# Patient Record
Sex: FEMALE | Race: OTHER | NOT HISPANIC OR LATINO | URBAN - METROPOLITAN AREA
[De-identification: names, ages, dates, MRNs, and addresses within clinical notes are randomized per-mention and may not be internally consistent; named-entity substitution may affect disease eponyms.]

---

## 2022-12-24 ENCOUNTER — INPATIENT (INPATIENT)
Facility: HOSPITAL | Age: 32
LOS: 2 days | Discharge: HOME | End: 2022-12-27
Attending: OBSTETRICS & GYNECOLOGY | Admitting: OBSTETRICS & GYNECOLOGY
Payer: COMMERCIAL

## 2022-12-24 VITALS — WEIGHT: 158.95 LBS | HEIGHT: 69 IN

## 2022-12-24 DIAGNOSIS — Z3A.00 WEEKS OF GESTATION OF PREGNANCY NOT SPECIFIED: ICD-10-CM

## 2022-12-24 DIAGNOSIS — Z34.80 ENCOUNTER FOR SUPERVISION OF OTHER NORMAL PREGNANCY, UNSPECIFIED TRIMESTER: ICD-10-CM

## 2022-12-24 DIAGNOSIS — O26.899 OTHER SPECIFIED PREGNANCY RELATED CONDITIONS, UNSPECIFIED TRIMESTER: ICD-10-CM

## 2022-12-24 LAB
ABO RH CONFIRMATION: SIGNIFICANT CHANGE UP
ALBUMIN SERPL ELPH-MCNC: 2.8 G/DL — LOW (ref 3.5–5)
ALP SERPL-CCNC: 175 U/L — HIGH (ref 40–120)
ALT FLD-CCNC: 22 U/L DA — SIGNIFICANT CHANGE UP (ref 10–60)
ANION GAP SERPL CALC-SCNC: 11 MMOL/L — SIGNIFICANT CHANGE UP (ref 5–17)
APTT BLD: 26.4 SEC — LOW (ref 27.5–35.5)
AST SERPL-CCNC: 30 U/L — SIGNIFICANT CHANGE UP (ref 10–40)
BASOPHILS # BLD AUTO: 0.02 K/UL — SIGNIFICANT CHANGE UP (ref 0–0.2)
BASOPHILS NFR BLD AUTO: 0.2 % — SIGNIFICANT CHANGE UP (ref 0–2)
BILIRUB SERPL-MCNC: 0.9 MG/DL — SIGNIFICANT CHANGE UP (ref 0.2–1.2)
BLD GP AB SCN SERPL QL: SIGNIFICANT CHANGE UP
BUN SERPL-MCNC: 10 MG/DL — SIGNIFICANT CHANGE UP (ref 7–18)
CALCIUM SERPL-MCNC: 9.6 MG/DL — SIGNIFICANT CHANGE UP (ref 8.4–10.5)
CHLORIDE SERPL-SCNC: 104 MMOL/L — SIGNIFICANT CHANGE UP (ref 96–108)
CO2 SERPL-SCNC: 22 MMOL/L — SIGNIFICANT CHANGE UP (ref 22–31)
CREAT SERPL-MCNC: 0.82 MG/DL — SIGNIFICANT CHANGE UP (ref 0.5–1.3)
EGFR: 99 ML/MIN/1.73M2 — SIGNIFICANT CHANGE UP
EOSINOPHIL # BLD AUTO: 0 K/UL — SIGNIFICANT CHANGE UP (ref 0–0.5)
EOSINOPHIL NFR BLD AUTO: 0 % — SIGNIFICANT CHANGE UP (ref 0–6)
GLUCOSE SERPL-MCNC: 84 MG/DL — SIGNIFICANT CHANGE UP (ref 70–99)
HCT VFR BLD CALC: 36.4 % — SIGNIFICANT CHANGE UP (ref 34.5–45)
HGB BLD-MCNC: 12.2 G/DL — SIGNIFICANT CHANGE UP (ref 11.5–15.5)
IMM GRANULOCYTES NFR BLD AUTO: 0.7 % — SIGNIFICANT CHANGE UP (ref 0–0.9)
INR BLD: 1.02 RATIO — SIGNIFICANT CHANGE UP (ref 0.88–1.16)
LIDOCAIN IGE QN: 129 U/L — SIGNIFICANT CHANGE UP (ref 73–393)
LYMPHOCYTES # BLD AUTO: 0.21 K/UL — LOW (ref 1–3.3)
LYMPHOCYTES # BLD AUTO: 1.6 % — LOW (ref 13–44)
MCHC RBC-ENTMCNC: 30 PG — SIGNIFICANT CHANGE UP (ref 27–34)
MCHC RBC-ENTMCNC: 33.5 GM/DL — SIGNIFICANT CHANGE UP (ref 32–36)
MCV RBC AUTO: 89.4 FL — SIGNIFICANT CHANGE UP (ref 80–100)
MONOCYTES # BLD AUTO: 0.91 K/UL — HIGH (ref 0–0.9)
MONOCYTES NFR BLD AUTO: 6.9 % — SIGNIFICANT CHANGE UP (ref 2–14)
NEUTROPHILS # BLD AUTO: 12.02 K/UL — HIGH (ref 1.8–7.4)
NEUTROPHILS NFR BLD AUTO: 90.6 % — HIGH (ref 43–77)
NRBC # BLD: 0 /100 WBCS — SIGNIFICANT CHANGE UP (ref 0–0)
PLATELET # BLD AUTO: 242 K/UL — SIGNIFICANT CHANGE UP (ref 150–400)
POTASSIUM SERPL-MCNC: 3.9 MMOL/L — SIGNIFICANT CHANGE UP (ref 3.5–5.3)
POTASSIUM SERPL-SCNC: 3.9 MMOL/L — SIGNIFICANT CHANGE UP (ref 3.5–5.3)
PROT SERPL-MCNC: 7.1 G/DL — SIGNIFICANT CHANGE UP (ref 6–8.3)
PROTHROM AB SERPL-ACNC: 12.2 SEC — SIGNIFICANT CHANGE UP (ref 10.5–13.4)
RBC # BLD: 4.07 M/UL — SIGNIFICANT CHANGE UP (ref 3.8–5.2)
RBC # FLD: 14.6 % — HIGH (ref 10.3–14.5)
SODIUM SERPL-SCNC: 137 MMOL/L — SIGNIFICANT CHANGE UP (ref 135–145)
WBC # BLD: 13.25 K/UL — HIGH (ref 3.8–10.5)
WBC # FLD AUTO: 13.25 K/UL — HIGH (ref 3.8–10.5)

## 2022-12-24 PROCEDURE — 59514 CESAREAN DELIVERY ONLY: CPT | Mod: U7

## 2022-12-24 PROCEDURE — 88307 TISSUE EXAM BY PATHOLOGIST: CPT | Mod: 26

## 2022-12-24 RX ORDER — SODIUM CHLORIDE 9 MG/ML
1000 INJECTION, SOLUTION INTRAVENOUS ONCE
Refills: 0 | Status: COMPLETED | OUTPATIENT
Start: 2022-12-24 | End: 2022-12-24

## 2022-12-24 RX ORDER — ACETAMINOPHEN 500 MG
975 TABLET ORAL
Refills: 0 | Status: DISCONTINUED | OUTPATIENT
Start: 2022-12-24 | End: 2022-12-27

## 2022-12-24 RX ORDER — IBUPROFEN 200 MG
600 TABLET ORAL EVERY 6 HOURS
Refills: 0 | Status: COMPLETED | OUTPATIENT
Start: 2022-12-24 | End: 2023-11-22

## 2022-12-24 RX ORDER — CEFAZOLIN SODIUM 1 G
2000 VIAL (EA) INJECTION ONCE
Refills: 0 | Status: COMPLETED | OUTPATIENT
Start: 2022-12-24 | End: 2022-12-24

## 2022-12-24 RX ORDER — KETOROLAC TROMETHAMINE 30 MG/ML
30 SYRINGE (ML) INJECTION EVERY 6 HOURS
Refills: 0 | Status: DISCONTINUED | OUTPATIENT
Start: 2022-12-24 | End: 2022-12-25

## 2022-12-24 RX ORDER — FAMOTIDINE 10 MG/ML
20 INJECTION INTRAVENOUS ONCE
Refills: 0 | Status: COMPLETED | OUTPATIENT
Start: 2022-12-24 | End: 2022-12-24

## 2022-12-24 RX ORDER — LANOLIN
1 OINTMENT (GRAM) TOPICAL EVERY 6 HOURS
Refills: 0 | Status: DISCONTINUED | OUTPATIENT
Start: 2022-12-24 | End: 2022-12-27

## 2022-12-24 RX ORDER — OXYTOCIN 10 UNIT/ML
333.33 VIAL (ML) INJECTION
Qty: 20 | Refills: 0 | Status: DISCONTINUED | OUTPATIENT
Start: 2022-12-24 | End: 2022-12-27

## 2022-12-24 RX ORDER — MORPHINE SULFATE 50 MG/1
0.3 CAPSULE, EXTENDED RELEASE ORAL ONCE
Refills: 0 | Status: DISCONTINUED | OUTPATIENT
Start: 2022-12-24 | End: 2022-12-27

## 2022-12-24 RX ORDER — SODIUM CHLORIDE 9 MG/ML
1000 INJECTION, SOLUTION INTRAVENOUS
Refills: 0 | Status: DISCONTINUED | OUTPATIENT
Start: 2022-12-24 | End: 2022-12-24

## 2022-12-24 RX ORDER — SIMETHICONE 80 MG/1
80 TABLET, CHEWABLE ORAL EVERY 4 HOURS
Refills: 0 | Status: DISCONTINUED | OUTPATIENT
Start: 2022-12-24 | End: 2022-12-27

## 2022-12-24 RX ORDER — SODIUM CHLORIDE 9 MG/ML
1000 INJECTION, SOLUTION INTRAVENOUS
Refills: 0 | Status: DISCONTINUED | OUTPATIENT
Start: 2022-12-24 | End: 2022-12-27

## 2022-12-24 RX ORDER — MAGNESIUM HYDROXIDE 400 MG/1
30 TABLET, CHEWABLE ORAL
Refills: 0 | Status: DISCONTINUED | OUTPATIENT
Start: 2022-12-24 | End: 2022-12-27

## 2022-12-24 RX ORDER — HEPARIN SODIUM 5000 [USP'U]/ML
5000 INJECTION INTRAVENOUS; SUBCUTANEOUS EVERY 12 HOURS
Refills: 0 | Status: DISCONTINUED | OUTPATIENT
Start: 2022-12-24 | End: 2022-12-27

## 2022-12-24 RX ORDER — TETANUS TOXOID, REDUCED DIPHTHERIA TOXOID AND ACELLULAR PERTUSSIS VACCINE, ADSORBED 5; 2.5; 8; 8; 2.5 [IU]/.5ML; [IU]/.5ML; UG/.5ML; UG/.5ML; UG/.5ML
0.5 SUSPENSION INTRAMUSCULAR ONCE
Refills: 0 | Status: DISCONTINUED | OUTPATIENT
Start: 2022-12-24 | End: 2022-12-27

## 2022-12-24 RX ORDER — DIPHENHYDRAMINE HCL 50 MG
25 CAPSULE ORAL EVERY 6 HOURS
Refills: 0 | Status: DISCONTINUED | OUTPATIENT
Start: 2022-12-24 | End: 2022-12-27

## 2022-12-24 RX ORDER — OXYCODONE HYDROCHLORIDE 5 MG/1
5 TABLET ORAL
Refills: 0 | Status: DISCONTINUED | OUTPATIENT
Start: 2022-12-24 | End: 2022-12-27

## 2022-12-24 RX ADMIN — Medication 100 MILLIGRAM(S): at 17:15

## 2022-12-24 RX ADMIN — FAMOTIDINE 20 MILLIGRAM(S): 10 INJECTION INTRAVENOUS at 17:15

## 2022-12-24 RX ADMIN — Medication 30 MILLIGRAM(S): at 23:19

## 2022-12-24 RX ADMIN — SODIUM CHLORIDE 2000 MILLILITER(S): 9 INJECTION, SOLUTION INTRAVENOUS at 17:14

## 2022-12-24 RX ADMIN — Medication 30 MILLIGRAM(S): at 23:50

## 2022-12-24 RX ADMIN — Medication 1000 MILLIUNIT(S)/MIN: at 18:55

## 2022-12-24 RX ADMIN — SODIUM CHLORIDE 125 MILLILITER(S): 9 INJECTION, SOLUTION INTRAVENOUS at 23:00

## 2022-12-24 RX ADMIN — SODIUM CHLORIDE 125 MILLILITER(S): 9 INJECTION, SOLUTION INTRAVENOUS at 17:15

## 2022-12-25 DIAGNOSIS — J06.9 ACUTE UPPER RESPIRATORY INFECTION, UNSPECIFIED: ICD-10-CM

## 2022-12-25 LAB
BASOPHILS # BLD AUTO: 0.01 K/UL — SIGNIFICANT CHANGE UP (ref 0–0.2)
BASOPHILS NFR BLD AUTO: 0.1 % — SIGNIFICANT CHANGE UP (ref 0–2)
EOSINOPHIL # BLD AUTO: 0 K/UL — SIGNIFICANT CHANGE UP (ref 0–0.5)
EOSINOPHIL NFR BLD AUTO: 0 % — SIGNIFICANT CHANGE UP (ref 0–6)
HCT VFR BLD CALC: 32.7 % — LOW (ref 34.5–45)
HGB BLD-MCNC: 10.6 G/DL — LOW (ref 11.5–15.5)
IMM GRANULOCYTES NFR BLD AUTO: 1.3 % — HIGH (ref 0–0.9)
LYMPHOCYTES # BLD AUTO: 0.37 K/UL — LOW (ref 1–3.3)
LYMPHOCYTES # BLD AUTO: 3.8 % — LOW (ref 13–44)
MCHC RBC-ENTMCNC: 29.4 PG — SIGNIFICANT CHANGE UP (ref 27–34)
MCHC RBC-ENTMCNC: 32.4 GM/DL — SIGNIFICANT CHANGE UP (ref 32–36)
MCV RBC AUTO: 90.8 FL — SIGNIFICANT CHANGE UP (ref 80–100)
MONOCYTES # BLD AUTO: 0.61 K/UL — SIGNIFICANT CHANGE UP (ref 0–0.9)
MONOCYTES NFR BLD AUTO: 6.2 % — SIGNIFICANT CHANGE UP (ref 2–14)
NEUTROPHILS # BLD AUTO: 8.68 K/UL — HIGH (ref 1.8–7.4)
NEUTROPHILS NFR BLD AUTO: 88.6 % — HIGH (ref 43–77)
NRBC # BLD: 0 /100 WBCS — SIGNIFICANT CHANGE UP (ref 0–0)
PLATELET # BLD AUTO: 203 K/UL — SIGNIFICANT CHANGE UP (ref 150–400)
RBC # BLD: 3.6 M/UL — LOW (ref 3.8–5.2)
RBC # FLD: 14.9 % — HIGH (ref 10.3–14.5)
T PALLIDUM AB TITR SER: NEGATIVE — SIGNIFICANT CHANGE UP
WBC # BLD: 9.8 K/UL — SIGNIFICANT CHANGE UP (ref 3.8–10.5)
WBC # FLD AUTO: 9.8 K/UL — SIGNIFICANT CHANGE UP (ref 3.8–10.5)

## 2022-12-25 RX ORDER — IBUPROFEN 200 MG
1 TABLET ORAL
Qty: 20 | Refills: 0
Start: 2022-12-25

## 2022-12-25 RX ORDER — DOCUSATE SODIUM 100 MG
1 CAPSULE ORAL
Qty: 30 | Refills: 0
Start: 2022-12-25

## 2022-12-25 RX ORDER — FERROUS SULFATE 325(65) MG
1 TABLET ORAL
Qty: 30 | Refills: 0
Start: 2022-12-25 | End: 2023-01-23

## 2022-12-25 RX ORDER — ACETAMINOPHEN 500 MG
2 TABLET ORAL
Qty: 20 | Refills: 0
Start: 2022-12-25

## 2022-12-25 RX ADMIN — HEPARIN SODIUM 5000 UNIT(S): 5000 INJECTION INTRAVENOUS; SUBCUTANEOUS at 06:50

## 2022-12-25 RX ADMIN — Medication 200 MILLIGRAM(S): at 06:50

## 2022-12-25 RX ADMIN — SIMETHICONE 80 MILLIGRAM(S): 80 TABLET, CHEWABLE ORAL at 13:12

## 2022-12-25 RX ADMIN — Medication 30 MILLIGRAM(S): at 06:50

## 2022-12-25 RX ADMIN — Medication 975 MILLIGRAM(S): at 21:13

## 2022-12-25 RX ADMIN — Medication 975 MILLIGRAM(S): at 09:59

## 2022-12-25 RX ADMIN — Medication 975 MILLIGRAM(S): at 15:52

## 2022-12-25 RX ADMIN — Medication 30 MILLIGRAM(S): at 13:11

## 2022-12-25 RX ADMIN — Medication 30 MILLIGRAM(S): at 14:34

## 2022-12-25 RX ADMIN — Medication 975 MILLIGRAM(S): at 21:45

## 2022-12-25 RX ADMIN — Medication 30 MILLIGRAM(S): at 18:37

## 2022-12-25 RX ADMIN — SIMETHICONE 80 MILLIGRAM(S): 80 TABLET, CHEWABLE ORAL at 18:37

## 2022-12-25 RX ADMIN — HEPARIN SODIUM 5000 UNIT(S): 5000 INJECTION INTRAVENOUS; SUBCUTANEOUS at 18:37

## 2022-12-25 RX ADMIN — Medication 30 MILLIGRAM(S): at 19:00

## 2022-12-25 RX ADMIN — Medication 975 MILLIGRAM(S): at 08:25

## 2022-12-25 RX ADMIN — Medication 30 MILLIGRAM(S): at 07:20

## 2022-12-25 RX ADMIN — Medication 975 MILLIGRAM(S): at 16:34

## 2022-12-25 NOTE — DISCHARGE NOTE OB - PLAN OF CARE
Continue breastfeeding.  Motrin as needed for pain.  Ambulate daily.  No heavy lifting or anything per vagina x 6 weeks - no sex, tampons, douching, tub baths, etc.  Follow up in office in 2 weeks for incision check, and then at 6 weeks for postpartum check. supportive care Continue breastfeeding.  Motrin as needed for pain.  Ambulate daily.  No heavy lifting or anything per vagina x 6 weeks - no sex, tampons, douching, tub baths, etc.  Follow up in office in 1-2 weeks for incision check, and then at 6 weeks for postpartum check. supportive care  tylenol for pain/fever, continue vitamin c

## 2022-12-25 NOTE — PROGRESS NOTE ADULT - PROBLEM SELECTOR PLAN 1
-Pain management as needed  -OOB and ambulate  -f/u Rpt CBC   -f/u void  -Advance diet to regular  -Encourage breastfeeding   -supportive care  -d/w Dr Long

## 2022-12-25 NOTE — DISCHARGE NOTE OB - NS MD DC FALL RISK RISK
For information on Fall & Injury Prevention, visit: [email or web address] OR  [email or web address] OR  https://www.cdc.gov/steadi/patient.html

## 2022-12-25 NOTE — DISCHARGE NOTE OB - HOSPITAL COURSE
pt resented w URI sympromps in labor had rpt c/s w normal post op course 31yo presenting in latent labor w h/o previous c/s w/ URI symptoms, fever 103 in ED, cough, POD #3 s/p repeat c/s @ 37.4wks, cough and flu neg, pt stable, uncomplicated delivery and postpartum course

## 2022-12-25 NOTE — DISCHARGE NOTE OB - MATERIALS PROVIDED
[facility] [place] Screening Program/  Immunization Record/Breastfeeding Mother’s Support Group Information/Guide to Postpartum Care/Back To Sleep Handout/Shaken Baby Prevention Handout/Breastfeeding Guide and Packet/Discharge Medication Information for Patients and Families Pocket Guide/Letter of Medical Neccessity

## 2022-12-25 NOTE — DISCHARGE NOTE OB - CARE PROVIDER_API CALL
Bina Mendez  OBSTETRICS AND GYNECOLOGY  [address] [place] [address]  Phone: [phone]  Fax: [phone]  Follow Up Time: 1 week

## 2022-12-25 NOTE — PROGRESS NOTE ADULT - PROBLEM SELECTOR PLAN 2
-Pain management as needed  -OOB and ambulate  -f/u Rpt CBC   -f/u void  -Advance diet to regular  -Encourage breastfeeding   -supportive care  -luma  -d/w Dr Long

## 2022-12-25 NOTE — DISCHARGE NOTE OB - CARE PLAN
Principal Discharge DX:	 delivery delivered  Assessment and plan of treatment:	Continue breastfeeding.  Motrin as needed for pain.  Ambulate daily.  No heavy lifting or anything per vagina x 6 weeks - no sex, tampons, douching, tub baths, etc.  Follow up in office in 2 weeks for incision check, and then at 6 weeks for postpartum check.  Secondary Diagnosis:	URI (upper respiratory infection)  Assessment and plan of treatment:	supportive care   1 Principal Discharge DX:	 delivery delivered  Assessment and plan of treatment:	Continue breastfeeding.  Motrin as needed for pain.  Ambulate daily.  No heavy lifting or anything per vagina x 6 weeks - no sex, tampons, douching, tub baths, etc.  Follow up in office in 1-2 weeks for incision check, and then at 6 weeks for postpartum check.  Secondary Diagnosis:	URI (upper respiratory infection)  Assessment and plan of treatment:	supportive care  tylenol for pain/fever, continue vitamin c

## 2022-12-25 NOTE — DISCHARGE NOTE OB - MEDICATION SUMMARY - MEDICATIONS TO TAKE
I will START or STAY ON the medications listed below when I get home from the hospital:    ibuprofen 600 mg oral tablet  -- 1 tab(s) by mouth every 6 hours   -- Do not take this drug if you are pregnant.  It is very important that you take or use this exactly as directed.  Do not skip doses or discontinue unless directed by your doctor.  May cause drowsiness or dizziness.  Obtain medical advice before taking any non-prescription drugs as some may affect the action of this medication.  Take with food or milk.    -- Indication: For pain    Tylenol Extra Strength 500 mg oral tablet  -- 2 tab(s) by mouth every 6 hours   -- This product contains acetaminophen.  Do not use  with any other product containing acetaminophen to prevent possible liver damage.    -- Indication: For pain    Tussin Mucus + Chest Congestion 100 mg/5 mL oral liquid  -- 10 milliliter(s) by mouth every 4 hours   -- Medication should be taken with plenty of water.    -- Indication: For Cough    ferrous sulfate 325 mg (65 mg elemental iron) oral tablet  -- 1 tab(s) by mouth once a day   -- Check with your doctor before becoming pregnant.  Do not chew, break, or crush.  May discolor urine or feces.    -- Indication: For anemia    Prenatal Plus Low Iron oral tablet  -- 1 tab(s) by mouth once a day   -- May discolor urine or feces.  Take with food or milk.    -- Indication: For Supplementation    Colace 100 mg oral capsule  -- 1 cap(s) by mouth once a day   -- Medication should be taken with plenty of water.    -- Indication: For Constipation

## 2022-12-25 NOTE — DISCHARGE NOTE OB - PATIENT PORTAL LINK FT
You can access the FollowMyHealth Patient Portal offered by [facility] by registering at the following website: [email or web address]. By joining [employer]’s FollowMyHealth portal, you will also be able to view your health information using other applications (apps) compatible with our system.

## 2022-12-26 LAB
BASOPHILS # BLD AUTO: 0.02 K/UL — SIGNIFICANT CHANGE UP (ref 0–0.2)
BASOPHILS NFR BLD AUTO: 0.2 % — SIGNIFICANT CHANGE UP (ref 0–2)
EOSINOPHIL # BLD AUTO: 0.03 K/UL — SIGNIFICANT CHANGE UP (ref 0–0.5)
EOSINOPHIL NFR BLD AUTO: 0.3 % — SIGNIFICANT CHANGE UP (ref 0–6)
HCT VFR BLD CALC: 35.2 % — SIGNIFICANT CHANGE UP (ref 34.5–45)
HGB BLD-MCNC: 11.4 G/DL — LOW (ref 11.5–15.5)
IMM GRANULOCYTES NFR BLD AUTO: 0.6 % — SIGNIFICANT CHANGE UP (ref 0–0.9)
LYMPHOCYTES # BLD AUTO: 1.09 K/UL — SIGNIFICANT CHANGE UP (ref 1–3.3)
LYMPHOCYTES # BLD AUTO: 11.5 % — LOW (ref 13–44)
MCHC RBC-ENTMCNC: 29.6 PG — SIGNIFICANT CHANGE UP (ref 27–34)
MCHC RBC-ENTMCNC: 32.4 GM/DL — SIGNIFICANT CHANGE UP (ref 32–36)
MCV RBC AUTO: 91.4 FL — SIGNIFICANT CHANGE UP (ref 80–100)
MONOCYTES # BLD AUTO: 0.37 K/UL — SIGNIFICANT CHANGE UP (ref 0–0.9)
MONOCYTES NFR BLD AUTO: 3.9 % — SIGNIFICANT CHANGE UP (ref 2–14)
NEUTROPHILS # BLD AUTO: 7.87 K/UL — HIGH (ref 1.8–7.4)
NEUTROPHILS NFR BLD AUTO: 83.5 % — HIGH (ref 43–77)
NRBC # BLD: 0 /100 WBCS — SIGNIFICANT CHANGE UP (ref 0–0)
PLATELET # BLD AUTO: 227 K/UL — SIGNIFICANT CHANGE UP (ref 150–400)
RBC # BLD: 3.85 M/UL — SIGNIFICANT CHANGE UP (ref 3.8–5.2)
RBC # FLD: 14.9 % — HIGH (ref 10.3–14.5)
WBC # BLD: 9.44 K/UL — SIGNIFICANT CHANGE UP (ref 3.8–10.5)
WBC # FLD AUTO: 9.44 K/UL — SIGNIFICANT CHANGE UP (ref 3.8–10.5)

## 2022-12-26 RX ORDER — IBUPROFEN 200 MG
600 TABLET ORAL EVERY 6 HOURS
Refills: 0 | Status: DISCONTINUED | OUTPATIENT
Start: 2022-12-26 | End: 2022-12-27

## 2022-12-26 RX ADMIN — Medication 600 MILLIGRAM(S): at 13:02

## 2022-12-26 RX ADMIN — Medication 975 MILLIGRAM(S): at 16:04

## 2022-12-26 RX ADMIN — Medication 975 MILLIGRAM(S): at 03:13

## 2022-12-26 RX ADMIN — Medication 600 MILLIGRAM(S): at 14:18

## 2022-12-26 RX ADMIN — Medication 975 MILLIGRAM(S): at 20:41

## 2022-12-26 RX ADMIN — Medication 200 MILLIGRAM(S): at 06:24

## 2022-12-26 RX ADMIN — Medication 975 MILLIGRAM(S): at 22:15

## 2022-12-26 RX ADMIN — MAGNESIUM HYDROXIDE 30 MILLILITER(S): 400 TABLET, CHEWABLE ORAL at 06:24

## 2022-12-26 RX ADMIN — Medication 975 MILLIGRAM(S): at 03:43

## 2022-12-26 RX ADMIN — Medication 600 MILLIGRAM(S): at 19:00

## 2022-12-26 RX ADMIN — Medication 975 MILLIGRAM(S): at 17:17

## 2022-12-26 RX ADMIN — Medication 600 MILLIGRAM(S): at 05:56

## 2022-12-26 RX ADMIN — Medication 975 MILLIGRAM(S): at 10:17

## 2022-12-26 RX ADMIN — HEPARIN SODIUM 5000 UNIT(S): 5000 INJECTION INTRAVENOUS; SUBCUTANEOUS at 05:55

## 2022-12-26 RX ADMIN — Medication 600 MILLIGRAM(S): at 18:31

## 2022-12-26 RX ADMIN — Medication 600 MILLIGRAM(S): at 06:26

## 2022-12-26 RX ADMIN — HEPARIN SODIUM 5000 UNIT(S): 5000 INJECTION INTRAVENOUS; SUBCUTANEOUS at 18:30

## 2022-12-26 RX ADMIN — Medication 975 MILLIGRAM(S): at 09:59

## 2022-12-26 RX ADMIN — Medication 600 MILLIGRAM(S): at 23:45

## 2022-12-26 NOTE — PROGRESS NOTE ADULT - PROBLEM SELECTOR PLAN 1
A/P: 33yo presenting in latent labor w h/o previous c/s w/ URI symptoms, fever 103 in ED, cough, POD #2 s/p repeat c/s @ 37.4wks, cough and flu neg, pt stable  - Robitussin prn for cough, encouraged to wear abdominal binder   -Pain management as needed  -cont post op care  -VTE prophylaxis: heparin, OOB and ambulate  - f/u Rpt CBC   -encourage incentive spirometer use  -Encourage breastfeeding   -d/w Dr. Fox

## 2022-12-27 VITALS
RESPIRATION RATE: 18 BRPM | HEART RATE: 100 BPM | TEMPERATURE: 98 F | DIASTOLIC BLOOD PRESSURE: 60 MMHG | OXYGEN SATURATION: 100 % | SYSTOLIC BLOOD PRESSURE: 101 MMHG

## 2022-12-27 RX ADMIN — Medication 200 MILLIGRAM(S): at 05:47

## 2022-12-27 RX ADMIN — Medication 600 MILLIGRAM(S): at 06:15

## 2022-12-27 RX ADMIN — HEPARIN SODIUM 5000 UNIT(S): 5000 INJECTION INTRAVENOUS; SUBCUTANEOUS at 05:47

## 2022-12-27 RX ADMIN — Medication 600 MILLIGRAM(S): at 12:55

## 2022-12-27 RX ADMIN — Medication 600 MILLIGRAM(S): at 12:20

## 2022-12-27 RX ADMIN — Medication 600 MILLIGRAM(S): at 00:15

## 2022-12-27 RX ADMIN — Medication 600 MILLIGRAM(S): at 05:46

## 2022-12-27 NOTE — PROGRESS NOTE ADULT - PROBLEM SELECTOR PROBLEM 2
URI (upper respiratory infection)

## 2022-12-27 NOTE — LACTATION INITIAL EVALUATION - LACTATION INTERVENTIONS
Engorgement relief measures/milk removal discussed.  Importance of breastfeeding on Cue and avoidance of supplements reinforced.  Taught hand expression and provided with manual pump per pt's preference for milk removal; Breastfeeding on cue 8-12X/24 hours with diaper count to assess for adequate intake, safe skin to skin and rooming-in encouraged. Reinforced benefits of  exclusive breastfeeding for first 6 months and provided with breastfeeding community resource list for breastfeeding support/assistance available post-discharge and of importance of early f/u with pediatrician within 2-3 days./initiate/review safe skin-to-skin/initiate/review hand expression/initiate/review pumping guidelines and safe milk handling/initiate/review techniques for position and latch/post discharge community resources provided/review techniques to manage sore nipples/engorgement/initiate/review breast massage/compression/reviewed components of an effective feeding and at least 8 effective feedings per day required/reviewed importance of monitoring infant diapers, the breastfeeding log, and minimum output each day/reviewed risks of unnecessary formula supplementation/reviewed risks of artificial nipples/reviewed benefits and recommendations for rooming in/reviewed feeding on demand/by cue at least 8 times a day/reviewed indications of inadequate milk transfer that would require supplementation Engorgement relief measures/milk removal discussed.  Importance of breastfeeding on Cue and avoidance of supplements reinforced.  Taught hand expression and provided with manual pump per pt's preference for milk removal; Breastfeeding on cue 8-12X/24 hours with diaper count to assess for adequate intake, safe skin to skin and rooming-in encouraged. Reinforced benefits of  exclusive breastfeeding for first 6 months and provided with breastfeeding community resource list for breastfeeding support/assistance available post-discharge and of importance of early f/u with pediatrician within 2-3 days. Referred to Tele-lactation program for breastfeeding f/u post-discharge and to [facility] for cont'd breastfeeding support in community/initiate/review safe skin-to-skin/initiate/review hand expression/initiate/review pumping guidelines and safe milk handling/initiate/review techniques for position and latch/post discharge community resources provided/review techniques to manage sore nipples/engorgement/initiate/review breast massage/compression/reviewed components of an effective feeding and at least 8 effective feedings per day required/reviewed importance of monitoring infant diapers, the breastfeeding log, and minimum output each day/reviewed risks of unnecessary formula supplementation/reviewed risks of artificial nipples/reviewed benefits and recommendations for rooming in/reviewed feeding on demand/by cue at least 8 times a day/reviewed indications of inadequate milk transfer that would require supplementation

## 2022-12-27 NOTE — PROGRESS NOTE ADULT - SUBJECTIVE AND OBJECTIVE BOX
Patient seen at bedside resting comfortably offers no new complaints. + Ambulation, + void without difficulty, + flatus;  +bm;  tolerating regular diet. Pt both breastfeeding and bottle feeding. C/o intermittent coughing improving Pt denies headache, blurry vision or epigastric pain, chest pain, shortness of breath, N/V/D,  dizziness, palpitations, worsening vaginal bleeding.     Vital Signs Last 24 Hrs  T(C): 36.8 (27 Dec 2022 06:33), Max: 36.8 (27 Dec 2022 06:33)  T(F): 98.3 (27 Dec 2022 06:33), Max: 98.3 (27 Dec 2022 06:33)  HR: 100 (27 Dec 2022 06:33) (83 - 100)  BP: 101/60 (27 Dec 2022 06:33) (101/60 - 117/70)  BP(mean): --  RR: 18 (27 Dec 2022 06:33) (18 - 18)  SpO2: 100% (27 Dec 2022 06:33) (100% - 100%)    Parameters below as of 27 Dec 2022 06:33  Patient On (Oxygen Delivery Method): room air        Gen: A&O x 3, NAD  Chest: CTA B/L  Cardiac: S1,S2  RRR  Breast: Soft, nontender, nonengorged  Abdomen: +BS; soft; Nontender, nondistended, Incision C/D/I steri strips in place   Gyn: Minimal lochia  Extremities: Nontender, DTRS 2+, no worsening edema                          11.4   9.44  )-----------( 227      ( 26 Dec 2022 09:05 )             35.2       A/P: 33yo presenting in latent labor w h/o previous c/s w/ URI symptoms, fever 103 in ED, cough, POD #3 s/p repeat c/s @ 37.4wks, cough and flu neg, pt stable  - Robitussin prn for cough, encouraged to wear abdominal binder   -Pain management as needed  -cont post op care  -VTE prophylaxis: heparin, OOB and ambulate  -encourage incentive spirometer use  -Encourage breastfeeding   -d/w Dr. Friedman
Patient seen at bedside resting comfortably offers no new complaints. Pt still reporting persistent coughing, with incisional pain while coughing.+ Ambulation, + void without difficulty, + flatus;  no bm; tolerating regular diet. Pt both breastfeeding and bottle feeding. Pt denies fever, weakness, headache, blurry vision or epigastric pain, chest pain, shortness of breath, N/V/D,  dizziness, palpitations, worsening vaginal bleeding.    Vital Signs Last 24 Hrs  T(C): 36.9 (26 Dec 2022 05:02), Max: 36.9 (26 Dec 2022 05:02)  T(F): 98.4 (26 Dec 2022 05:02), Max: 98.4 (26 Dec 2022 05:02)  HR: 84 (26 Dec 2022 05:02) (84 - 101)  BP: 119/81 (26 Dec 2022 05:02) (118/78 - 128/81)  BP(mean): --  RR: 18 (26 Dec 2022 05:02) (18 - 18)  SpO2: 98% (26 Dec 2022 05:02) (97% - 98%)    Parameters below as of 26 Dec 2022 05:02  Patient On (Oxygen Delivery Method): room air        Gen: A&O x 3, NAD  Chest: CTABL  Cardiac: S1, S2, RRR  Breast: Soft, nontender, nonengorged  Abdomen: +BS; soft; Nontender, nondistended, Incision C/D/I steri strips in place   Gyn: Minimal lochia  Extremities: Nontender, DTRS 2+, no worsening edema                          10.6   9.80  )-----------( 203      ( 25 Dec 2022 06:56 )             32.7       A/P: 33yo presenting in latent labor w h/o previous c/s w/ URI symptoms, fever 103 in ED, cough, POD #2 s/p repeat c/s @ 37.4wks, cough and flu neg, pt stable  - Robitussin prn for cough, encouraged to wear abdominal binder   -Pain management as needed  -cont post op care  -VTE prophylaxis: heparin, OOB and ambulate  - f/u Rpt CBC   -encourage incentive spirometer use  -Encourage breastfeeding   -d/w Dr. Fox
Patient seen at bedside resting comfortably offers no new complaints. not yet ambulating, renner just removed no void spontaneously yet.  + flatus;  no bm; tolerating clr liq diet. both breast and bottle feeding. + dry non productive cough. Denies HA, blurry vision or epigastric pain, CP, SOB, N/V/D, dizziness, palpitations, worsening vaginal bleeding.    Vital Signs Last 24 Hrs  T(C): 36.8 (25 Dec 2022 06:21), Max: 39.5 (24 Dec 2022 13:48)  T(F): 98.2 (25 Dec 2022 06:21), Max: 103.1 (24 Dec 2022 13:48)  HR: 74 (25 Dec 2022 06:21) (74 - 141)  BP: 125/74 (25 Dec 2022 06:21) (110/62 - 134/71)  BP(mean): 96 (24 Dec 2022 22:00) (64 - 96)  RR: 18 (25 Dec 2022 06:21) (16 - 20)  SpO2: 98% (25 Dec 2022 06:21) (95% - 99%)    Parameters below as of 25 Dec 2022 06:21  Patient On (Oxygen Delivery Method): room air        Gen: A&O x 3, NAD  Chest: CTA B/L  Cardiac: S1,S2  RRR  Breast: Soft, nontender, nonengorged  Abdomen: +BS; soft; Nontender, nondistended; dressing removed incision C/D/I steri strips in place  Gyn: minimal lochia   Extremities: Nontender, venodynes in place                          10.6   9.80  )-----------( 203      ( 25 Dec 2022 06:56 )             32.7

## 2022-12-27 NOTE — LACTATION INITIAL EVALUATION - INTERVENTION OUTCOME
verbalizes understanding/demonstrates understanding of teaching/good return demonstration/needs met Tele-lactation program/verbalizes understanding/demonstrates understanding of teaching/good return demonstration/needs met/Lactation team to follow up

## 2022-12-27 NOTE — PROGRESS NOTE ADULT - ASSESSMENT
A/P: 31yo presenting in latent labor w h/o previous c/s w/ URI symptoms, fever 103 in ED, cough, POD #2 s/p repeat c/s @ 37.4wks, cough and flu neg, pt stable  - Robitussin prn for cough, encouraged to wear abdominal binder   -Pain management as needed  -cont post op care  -VTE prophylaxis: heparin, OOB and ambulate  - f/u Rpt CBC   -encourage incentive spirometer use  -Encourage breastfeeding   -d/w Dr. Fox
A/P: 33yo presenting in latent labor w h/o previous c/s w/ URI symptoms, fever 103 in ED, cough, POD #3 s/p repeat c/s @ 37.4wks, cough and flu neg, pt stable  - Robitussin prn for cough, encouraged to wear abdominal binder   -Pain management as needed  -cont post op care  -VTE prophylaxis: heparin, OOB and ambulate  -encourage incentive spirometer use  -Encourage breastfeeding   -d/w Dr. Friedman 
A/P: POD #1 s/p rpt c/s w viral URI

## 2022-12-27 NOTE — PROGRESS NOTE ADULT - PROBLEM SELECTOR PLAN 1
A/P: 33yo presenting in latent labor w h/o previous c/s w/ URI symptoms, fever 103 in ED, cough, POD #3 s/p repeat c/s @ 37.4wks, cough and flu neg, pt stable  - Robitussin prn for cough, encouraged to wear abdominal binder   -Pain management as needed  -cont post op care  -VTE prophylaxis: heparin, OOB and ambulate  -encourage incentive spirometer use  -Encourage breastfeeding   -d/w Dr. Friedman

## 2022-12-30 ENCOUNTER — NON-APPOINTMENT (OUTPATIENT)
Age: 32
End: 2022-12-30

## 2022-12-30 PROBLEM — Z00.00 ENCOUNTER FOR PREVENTIVE HEALTH EXAMINATION: Status: ACTIVE | Noted: 2022-12-30

## 2023-01-06 LAB — SURGICAL PATHOLOGY STUDY: SIGNIFICANT CHANGE UP

## 2023-08-05 ENCOUNTER — EMERGENCY (EMERGENCY)
Facility: HOSPITAL | Age: 33
LOS: 1 days | Discharge: HOME | End: 2023-08-05
Attending: EMERGENCY MEDICINE
Payer: SELF-PAY

## 2023-08-05 VITALS
WEIGHT: 128.97 LBS | TEMPERATURE: 98 F | DIASTOLIC BLOOD PRESSURE: 72 MMHG | HEIGHT: 69 IN | HEART RATE: 75 BPM | SYSTOLIC BLOOD PRESSURE: 115 MMHG | RESPIRATION RATE: 18 BRPM | OXYGEN SATURATION: 97 %

## 2023-08-05 VITALS
RESPIRATION RATE: 17 BRPM | HEART RATE: 63 BPM | SYSTOLIC BLOOD PRESSURE: 111 MMHG | DIASTOLIC BLOOD PRESSURE: 58 MMHG | OXYGEN SATURATION: 100 % | TEMPERATURE: 98 F

## 2023-08-05 LAB
ALBUMIN SERPL ELPH-MCNC: 3.6 G/DL — SIGNIFICANT CHANGE UP (ref 3.5–5)
ALP SERPL-CCNC: 57 U/L — SIGNIFICANT CHANGE UP (ref 40–120)
ALT FLD-CCNC: 13 U/L DA — SIGNIFICANT CHANGE UP (ref 10–60)
ANION GAP SERPL CALC-SCNC: 9 MMOL/L — SIGNIFICANT CHANGE UP (ref 5–17)
APPEARANCE UR: ABNORMAL
AST SERPL-CCNC: 11 U/L — SIGNIFICANT CHANGE UP (ref 10–40)
BACTERIA # UR AUTO: ABNORMAL /HPF
BASOPHILS # BLD AUTO: 0.03 K/UL — SIGNIFICANT CHANGE UP (ref 0–0.2)
BASOPHILS NFR BLD AUTO: 0.3 % — SIGNIFICANT CHANGE UP (ref 0–2)
BILIRUB SERPL-MCNC: 0.6 MG/DL — SIGNIFICANT CHANGE UP (ref 0.2–1.2)
BILIRUB UR-MCNC: NEGATIVE — SIGNIFICANT CHANGE UP
BUN SERPL-MCNC: 12 MG/DL — SIGNIFICANT CHANGE UP (ref 7–18)
CALCIUM SERPL-MCNC: 8.7 MG/DL — SIGNIFICANT CHANGE UP (ref 8.4–10.5)
CHLORIDE SERPL-SCNC: 106 MMOL/L — SIGNIFICANT CHANGE UP (ref 96–108)
CO2 SERPL-SCNC: 25 MMOL/L — SIGNIFICANT CHANGE UP (ref 22–31)
COLOR SPEC: ABNORMAL
CREAT SERPL-MCNC: 0.79 MG/DL — SIGNIFICANT CHANGE UP (ref 0.5–1.3)
DIFF PNL FLD: ABNORMAL
EGFR: 103 ML/MIN/1.73M2 — SIGNIFICANT CHANGE UP
EOSINOPHIL # BLD AUTO: 0.02 K/UL — SIGNIFICANT CHANGE UP (ref 0–0.5)
EOSINOPHIL NFR BLD AUTO: 0.2 % — SIGNIFICANT CHANGE UP (ref 0–6)
EPI CELLS # UR: ABNORMAL /HPF
GLUCOSE SERPL-MCNC: 77 MG/DL — SIGNIFICANT CHANGE UP (ref 70–99)
GLUCOSE UR QL: NEGATIVE — SIGNIFICANT CHANGE UP
HCG SERPL-ACNC: 235 MIU/ML — HIGH
HCT VFR BLD CALC: 35.5 % — SIGNIFICANT CHANGE UP (ref 34.5–45)
HGB BLD-MCNC: 11.6 G/DL — SIGNIFICANT CHANGE UP (ref 11.5–15.5)
IMM GRANULOCYTES NFR BLD AUTO: 0.3 % — SIGNIFICANT CHANGE UP (ref 0–0.9)
KETONES UR-MCNC: ABNORMAL
LEUKOCYTE ESTERASE UR-ACNC: ABNORMAL
LYMPHOCYTES # BLD AUTO: 2.13 K/UL — SIGNIFICANT CHANGE UP (ref 1–3.3)
LYMPHOCYTES # BLD AUTO: 21.5 % — SIGNIFICANT CHANGE UP (ref 13–44)
MCHC RBC-ENTMCNC: 30.2 PG — SIGNIFICANT CHANGE UP (ref 27–34)
MCHC RBC-ENTMCNC: 32.7 GM/DL — SIGNIFICANT CHANGE UP (ref 32–36)
MCV RBC AUTO: 92.4 FL — SIGNIFICANT CHANGE UP (ref 80–100)
MONOCYTES # BLD AUTO: 0.56 K/UL — SIGNIFICANT CHANGE UP (ref 0–0.9)
MONOCYTES NFR BLD AUTO: 5.7 % — SIGNIFICANT CHANGE UP (ref 2–14)
NEUTROPHILS # BLD AUTO: 7.14 K/UL — SIGNIFICANT CHANGE UP (ref 1.8–7.4)
NEUTROPHILS NFR BLD AUTO: 72 % — SIGNIFICANT CHANGE UP (ref 43–77)
NITRITE UR-MCNC: POSITIVE
NRBC # BLD: 0 /100 WBCS — SIGNIFICANT CHANGE UP (ref 0–0)
PH UR: 5 — SIGNIFICANT CHANGE UP (ref 5–8)
PLATELET # BLD AUTO: 367 K/UL — SIGNIFICANT CHANGE UP (ref 150–400)
POTASSIUM SERPL-MCNC: 3.5 MMOL/L — SIGNIFICANT CHANGE UP (ref 3.5–5.3)
POTASSIUM SERPL-SCNC: 3.5 MMOL/L — SIGNIFICANT CHANGE UP (ref 3.5–5.3)
PROT SERPL-MCNC: 7.5 G/DL — SIGNIFICANT CHANGE UP (ref 6–8.3)
PROT UR-MCNC: 100 MG/DL
RBC # BLD: 3.84 M/UL — SIGNIFICANT CHANGE UP (ref 3.8–5.2)
RBC # FLD: 12.3 % — SIGNIFICANT CHANGE UP (ref 10.3–14.5)
RBC CASTS # UR COMP ASSIST: >50 /HPF (ref 0–2)
SODIUM SERPL-SCNC: 140 MMOL/L — SIGNIFICANT CHANGE UP (ref 135–145)
SP GR SPEC: 1.02 — SIGNIFICANT CHANGE UP (ref 1.01–1.02)
UROBILINOGEN FLD QL: 1 MG/DL
WBC # BLD: 9.91 K/UL — SIGNIFICANT CHANGE UP (ref 3.8–10.5)
WBC # FLD AUTO: 9.91 K/UL — SIGNIFICANT CHANGE UP (ref 3.8–10.5)
WBC UR QL: SIGNIFICANT CHANGE UP /HPF (ref 0–5)

## 2023-08-05 PROCEDURE — 84702 CHORIONIC GONADOTROPIN TEST: CPT

## 2023-08-05 PROCEDURE — 76830 TRANSVAGINAL US NON-OB: CPT | Mod: 26

## 2023-08-05 PROCEDURE — 76830 TRANSVAGINAL US NON-OB: CPT

## 2023-08-05 PROCEDURE — 76815 OB US LIMITED FETUS(S): CPT

## 2023-08-05 PROCEDURE — 80053 COMPREHEN METABOLIC PANEL: CPT

## 2023-08-05 PROCEDURE — 36415 COLL VENOUS BLD VENIPUNCTURE: CPT

## 2023-08-05 PROCEDURE — 99285 EMERGENCY DEPT VISIT HI MDM: CPT

## 2023-08-05 PROCEDURE — 76815 OB US LIMITED FETUS(S): CPT | Mod: 26

## 2023-08-05 PROCEDURE — 85025 COMPLETE CBC W/AUTO DIFF WBC: CPT

## 2023-08-05 PROCEDURE — 99284 EMERGENCY DEPT VISIT MOD MDM: CPT | Mod: 25

## 2023-08-05 PROCEDURE — 81001 URINALYSIS AUTO W/SCOPE: CPT

## 2023-08-05 NOTE — ED PROVIDER NOTE - NSFOLLOWUPINSTRUCTIONS_ED_ALL_ED_FT
Thank you for choosing [facility] for your healthcare.    You were seen in the Emergency Department for a missed .  Here in the emergency room you had blood work which was reassuring and ultrasound which showed there is still a fetus in the uterus but it has no heartbeat.  It is possible that you still will have a spontaneous  at this time but we recommend you follow-up closely with OB/GYN in the next few days for a procedure if you do not pass the fetus on your own by that point.  Please return to the emergency room sooner for fevers, severe uncontrollable pain or for any other concerning or emergent medical issues.

## 2023-08-05 NOTE — ED ADULT NURSE NOTE - CHIEF COMPLAINT QUOTE
As per pt, c/o LLQ ABD cramping pain since this afternoon. Pt reports, "I am 9 weeks and took the  pill 2023 and didn't follow up to come back, I was at the  clinic at 11am this morning and I was told they couldn't complete the procedure due to her cervix being closed".

## 2023-08-05 NOTE — ED ADULT NURSE NOTE - OBJECTIVE STATEMENT
Patient presented to ED c/o LLQ pain, vaginal bleeding and 9 weeks pregnant, s/p  pill on . Pt denies any dizziness, sob or fevers.

## 2023-08-05 NOTE — ED PROVIDER NOTE - OBJECTIVE STATEMENT
32-year-old woman G3, P2 presenting for evaluation of a missed .  She reports being approximately 9 weeks by dates.  On  she took pills for medical  and was supposed to follow-up with the outpatient providers for recheck but was not able to see them until today.  On follow-up today they still noticed products of conception and referred the patient to the hospital for further management.  She reports some associated pelvic discomfort which she rates to the 2 out of 10.  After taking the pills she had some small bleeding and a small amount of tissue passed but no large amount.  She is denying associated fevers chills nausea vomiting or any other complaints

## 2023-08-05 NOTE — ED ADULT NURSE NOTE - NSFALLUNIVINTERV_ED_ALL_ED
Bed/Stretcher in lowest position, wheels locked, appropriate side rails in place/Call bell, personal items and telephone in reach/Instruct patient to call for assistance before getting out of bed/chair/stretcher/Non-slip footwear applied when patient is off stretcher/Cross to call system/Physically safe environment - no spills, clutter or unnecessary equipment/Purposeful proactive rounding/Room/bathroom lighting operational, light cord in reach

## 2023-08-05 NOTE — ED PROVIDER NOTE - PATIENT PORTAL LINK FT
You can access the FollowMyHealth Patient Portal offered by [facility] by registering at the following website: [email or web address]. By joining [employer]’s FollowMyHealth portal, you will also be able to view your health information using other applications (apps) compatible with our system.

## 2023-08-05 NOTE — ED PROVIDER NOTE - CLINICAL SUMMARY MEDICAL DECISION MAKING FREE TEXT BOX
Patient sent from outpatient clinic for missed medically induced .  Her abdominal exam is benign.  Will obtain labs and transvaginal ultrasound and discussed case with OB/GYN.

## 2023-08-08 ENCOUNTER — EMERGENCY (EMERGENCY)
Facility: HOSPITAL | Age: 33
LOS: 1 days | Discharge: HOME | End: 2023-08-08
Attending: EMERGENCY MEDICINE
Payer: MEDICAID

## 2023-08-08 VITALS
OXYGEN SATURATION: 100 % | DIASTOLIC BLOOD PRESSURE: 82 MMHG | TEMPERATURE: 98 F | HEART RATE: 78 BPM | RESPIRATION RATE: 18 BRPM | SYSTOLIC BLOOD PRESSURE: 132 MMHG

## 2023-08-08 PROCEDURE — 99285 EMERGENCY DEPT VISIT HI MDM: CPT

## 2023-08-08 PROCEDURE — 99053 MED SERV 10PM-8AM 24 HR FAC: CPT

## 2023-08-08 NOTE — ED ADULT TRIAGE NOTE - CHIEF COMPLAINT QUOTE
Pt had recent D&C, still has retained products. Pt was given additional "oral medication" still having suprapubic pain. Pt is concerned "the medication poisoned her blood stream."

## 2023-08-09 VITALS
DIASTOLIC BLOOD PRESSURE: 65 MMHG | HEART RATE: 80 BPM | OXYGEN SATURATION: 99 % | TEMPERATURE: 98 F | SYSTOLIC BLOOD PRESSURE: 103 MMHG | RESPIRATION RATE: 17 BRPM

## 2023-08-09 LAB
ALBUMIN SERPL ELPH-MCNC: 4.5 G/DL — SIGNIFICANT CHANGE UP (ref 3.3–5)
ALP SERPL-CCNC: 62 U/L — SIGNIFICANT CHANGE UP (ref 40–120)
ALT FLD-CCNC: <5 U/L — LOW (ref 10–45)
ANION GAP SERPL CALC-SCNC: 13 MMOL/L — SIGNIFICANT CHANGE UP (ref 5–17)
APPEARANCE UR: CLEAR — SIGNIFICANT CHANGE UP
AST SERPL-CCNC: 15 U/L — SIGNIFICANT CHANGE UP (ref 10–40)
BACTERIA # UR AUTO: NEGATIVE — SIGNIFICANT CHANGE UP
BILIRUB SERPL-MCNC: 0.2 MG/DL — SIGNIFICANT CHANGE UP (ref 0.2–1.2)
BILIRUB UR-MCNC: NEGATIVE — SIGNIFICANT CHANGE UP
BUN SERPL-MCNC: 12 MG/DL — SIGNIFICANT CHANGE UP (ref 7–23)
CALCIUM SERPL-MCNC: 9.7 MG/DL — SIGNIFICANT CHANGE UP (ref 8.4–10.5)
CHLORIDE SERPL-SCNC: 104 MMOL/L — SIGNIFICANT CHANGE UP (ref 96–108)
CO2 SERPL-SCNC: 24 MMOL/L — SIGNIFICANT CHANGE UP (ref 22–31)
COLOR SPEC: YELLOW — SIGNIFICANT CHANGE UP
CREAT SERPL-MCNC: 0.78 MG/DL — SIGNIFICANT CHANGE UP (ref 0.5–1.3)
DIFF PNL FLD: NEGATIVE — SIGNIFICANT CHANGE UP
EGFR: 105 ML/MIN/1.73M2 — SIGNIFICANT CHANGE UP
EPI CELLS # UR: 1 /HPF — SIGNIFICANT CHANGE UP
GLUCOSE SERPL-MCNC: 57 MG/DL — LOW (ref 70–99)
GLUCOSE UR QL: NEGATIVE — SIGNIFICANT CHANGE UP
HCG SERPL-ACNC: 178.5 MIU/ML — HIGH
HCT VFR BLD CALC: 37 % — SIGNIFICANT CHANGE UP (ref 34.5–45)
HGB BLD-MCNC: 11.4 G/DL — LOW (ref 11.5–15.5)
HYALINE CASTS # UR AUTO: 1 /LPF — SIGNIFICANT CHANGE UP (ref 0–2)
KETONES UR-MCNC: NEGATIVE — SIGNIFICANT CHANGE UP
LEUKOCYTE ESTERASE UR-ACNC: ABNORMAL
MCHC RBC-ENTMCNC: 29.4 PG — SIGNIFICANT CHANGE UP (ref 27–34)
MCHC RBC-ENTMCNC: 30.8 GM/DL — LOW (ref 32–36)
MCV RBC AUTO: 95.4 FL — SIGNIFICANT CHANGE UP (ref 80–100)
NITRITE UR-MCNC: NEGATIVE — SIGNIFICANT CHANGE UP
NRBC # BLD: 0 /100 WBCS — SIGNIFICANT CHANGE UP (ref 0–0)
PH UR: 6 — SIGNIFICANT CHANGE UP (ref 5–8)
PLATELET # BLD AUTO: 389 K/UL — SIGNIFICANT CHANGE UP (ref 150–400)
POTASSIUM SERPL-MCNC: 3.7 MMOL/L — SIGNIFICANT CHANGE UP (ref 3.5–5.3)
POTASSIUM SERPL-SCNC: 3.7 MMOL/L — SIGNIFICANT CHANGE UP (ref 3.5–5.3)
PROT SERPL-MCNC: 7.7 G/DL — SIGNIFICANT CHANGE UP (ref 6–8.3)
PROT UR-MCNC: ABNORMAL
RBC # BLD: 3.88 M/UL — SIGNIFICANT CHANGE UP (ref 3.8–5.2)
RBC # FLD: 12.7 % — SIGNIFICANT CHANGE UP (ref 10.3–14.5)
RBC CASTS # UR COMP ASSIST: 3 /HPF — SIGNIFICANT CHANGE UP (ref 0–4)
SODIUM SERPL-SCNC: 141 MMOL/L — SIGNIFICANT CHANGE UP (ref 135–145)
SP GR SPEC: 1.03 — HIGH (ref 1.01–1.02)
UROBILINOGEN FLD QL: NEGATIVE — SIGNIFICANT CHANGE UP
WBC # BLD: 9.35 K/UL — SIGNIFICANT CHANGE UP (ref 3.8–10.5)
WBC # FLD AUTO: 9.35 K/UL — SIGNIFICANT CHANGE UP (ref 3.8–10.5)
WBC UR QL: 11 /HPF — HIGH (ref 0–5)

## 2023-08-09 PROCEDURE — 84702 CHORIONIC GONADOTROPIN TEST: CPT

## 2023-08-09 PROCEDURE — 99284 EMERGENCY DEPT VISIT MOD MDM: CPT | Mod: 25

## 2023-08-09 PROCEDURE — 76817 TRANSVAGINAL US OBSTETRIC: CPT | Mod: 26

## 2023-08-09 PROCEDURE — 36415 COLL VENOUS BLD VENIPUNCTURE: CPT

## 2023-08-09 PROCEDURE — 80053 COMPREHEN METABOLIC PANEL: CPT

## 2023-08-09 PROCEDURE — 85027 COMPLETE CBC AUTOMATED: CPT

## 2023-08-09 PROCEDURE — 81001 URINALYSIS AUTO W/SCOPE: CPT

## 2023-08-09 PROCEDURE — 76817 TRANSVAGINAL US OBSTETRIC: CPT

## 2023-08-09 NOTE — ED PROVIDER NOTE - PATIENT PORTAL LINK FT
You can access the FollowMyHealth Patient Portal offered by [facility] by registering at the following website: [email or web address]. By joining [employer]’s FollowMyHealth portal, you will also be able to view your health information using other applications (apps) compatible with our system. You can access the FollowMyHealth Patient Portal offered by [facility] by registering at the following website: [email or web address]. By joining [employer]’s FollowMyHealth portal, you will also be able to view your health information using other applications (apps) compatible with our system. You can access the FollowMyHealth Patient Portal offered by [facility] by registering at the following website: [email or web address]. By joining [employer]’s FollowMyHealth portal, you will also be able to view your health information using other applications (apps) compatible with our system.

## 2023-08-09 NOTE — ED PROVIDER NOTE - NSFOLLOWUPCLINICS_GEN_ALL_ED_FT
[facility] Gynecology and Obstetrics  Gynceology/OB  [address] [place] [address]  Phone: [phone]  Fax:   Follow Up Time: Urgent     [facility] Gynecology and Obstetrics  Gynceology/OB  [address] [place] [address]  Phone: [phone]  Fax:   Follow Up Time: Urgent     [facility] Gynecology and Obstetrics  Gynceology/OB  [address] [place] [address]  Phone: [phone]  Fax:   Follow Up Time: Urgent

## 2023-08-09 NOTE — ED PROVIDER NOTE - NSFOLLOWUPINSTRUCTIONS_ED_ALL_ED_FT
Your workup today confirmed there is still a fetus in your uterus.    Fortunately it does not seem to be causing an infection or any other harm to you at this time.    You need to make an appointment with the OB/GYN clinic to talk about what treatment options would be best for you whether it would be a DNC versus further medical treatment or allowing the pregnancy to pass on its own.    Please call the number below to make that appointment.    If in the meantime you have worsening symptoms such as severe abdominal pain, worsening vaginal bleeding, dizziness, shortness of breath, if you pass out, or any other major concern, please come right back to the emergency department.

## 2023-08-09 NOTE — ED PROVIDER NOTE - OBJECTIVE STATEMENT
32-year-old female, here for evaluation of retained products of conception.  Patient states 32-year-old female, here for evaluation of retained products of conception.  Patient states she became pregnant early in .  She seeked prenatal care at a local clinic in [place].  There she was told that she was high risk for complications from this pregnancy; she is unsure exactly why but she understood that it was somewhat related to the fact that she had a  less than 6 months prior to that which made her high risk for delivery complications.  Because of that,  a joint decision between patient and treating team was made to proceed with medical .  Patient states about 6 weeks ago she received oral medications for this but she is unclear which medications it was.  Patient continued to have intermittent abdominal cramping and bleeding as expected over the next few weeks.  Last week, due to persistent symptoms, she went back to the clinic to seek care again.  At that time she was told she had retained products of conception including the dead fetus.  She had another round of medical treatment and was reevaluated this past Saturday at which time she had 2 attempted D&Cs in the clinic which both failed at successfully removing the retained products of conception, per her report.  At this time she was instructed to go to [facility] for higher level of care and management of her fetal demise.  She was seen at [facility] where she had an ultrasound and labs that confirmed fetal demise with retained products of conception.  ER team discussed care with OB/GYN who cleared her to go home for further outpatient treatment.  Patient is here because she is very unclear as to what further treatment she is going to require and she is concerned she is going to develop an infection due to the retained products and dead fetus.  Patient reports improvement of her symptoms, still with mild amount of intermittent lower abdominal cramping, much better than prior, and she states 2 days ago she is not having any vaginal bleeding.  She denies any other symptoms at this time 32-year-old female, here for evaluation of retained products of conception.  Patient states she became pregnant early in .  She seeked prenatal care at a local clinic in [place].  There she was told that she was high risk for complications from this pregnancy; she is unsure exactly why but she understood that it was somewhat related to the fact that she had a  less than 6 months prior to that which made her high risk for delivery complications.  Because of that,  a joint decision between patient and treating team was made to proceed with medical .  Patient states about 6 weeks ago she received oral medications for this but she is unclear which medications it was.  Patient continued to have intermittent abdominal cramping and bleeding as expected over the next few weeks.  Last week, due to persistent symptoms, she went back to the clinic to seek care again.  At that time she was told she had retained products of conception including the dead fetus.  She had another round of medical treatment and was reevaluated this past Saturday at which time she had 2 attempted D&Cs in the clinic which both failed at successfully removing the retained products of conception, per her report.  At this time she was instructed to go to [facility] for higher level of care and management of her fetal demise.  She was seen at [facility] where she had an ultrasound and labs that confirmed fetal demise with retained products of conception.  ER team discussed care with OB/GYN who cleared her to go home for further outpatient treatment.  Patient is here because she is very unclear as to what further treatment she is going to require and she is concerned she is going to develop an infection due to the retained products and dead fetus.  Patient reports improvement of her symptoms, still with mild amount of intermittent lower abdominal cramping, much better than prior, and she states 2 days ago she is not having any vaginal bleeding.  She denies any other symptoms at this time 32-year-old female, here for evaluation of retained products of conception.  Patient states she became pregnant early in .  She seeked prenatal care at a local clinic in [place].  There she was told that she was high risk for complications from this pregnancy; she is unsure exactly why but she understood that it was somewhat related to the fact that she had a  less than 6 months prior to that which made her high risk for delivery complications.  Because of that,  a joint decision between patient and treating team was made to proceed with medical .  Patient states about 6 weeks ago she received oral medications for this but she is unclear which medications it was.  Patient continued to have intermittent abdominal cramping and bleeding as expected over the next few weeks.  Last week, due to persistent symptoms, she went back to the clinic to seek care again.  At that time she was told she had retained products of conception including the dead fetus.  She had another round of medical treatment and was reevaluated this past Saturday at which time she had 2 attempted D&Cs in the clinic which both failed at successfully removing the retained products of conception, per her report.  At this time she was instructed to go to [facility] for higher level of care and management of her fetal demise.  She was seen at [facility] where she had an ultrasound and labs that confirmed fetal demise with retained products of conception.  ER team discussed care with OB/GYN who cleared her to go home for further outpatient treatment.  Patient is here because she is very unclear as to what further treatment she is going to require and she is concerned she is going to develop an infection due to the retained products and dead fetus.  Patient reports improvement of her symptoms, still with mild amount of intermittent lower abdominal cramping, much better than prior, and she states 2 days ago she is not having any vaginal bleeding.  She denies any other symptoms at this time

## 2023-08-09 NOTE — ED ADULT NURSE NOTE - OBJECTIVE STATEMENT
31 yo female A&OX4 ambulatory on arrival presenting to ED for concern of "infection in my blood." Patient A1, recent  due to high risk at 9 weeks . Patient did not follow up, states has retained products that were never removed. Went for D&C and was unable to have retained products removed. Patient was told that products would be expelled on their own, still have not been discharged after 45 days. Patient was told by cousin that she may have "a blood infection." Patient denies fevers/chills. c/o 2/10 burning RLQ abd pain. Denies n/v/d/dizziness, c/o some chest tightness, nonradiating. Denies SOB.

## 2023-08-09 NOTE — ED PROVIDER NOTE - PHYSICAL EXAMINATION
GEN: Well Appearing, Nontoxic, NAD  HEENT: NC/AT, MMM  Neck: supple  CV: reg rate  RESP: normal WOB, no distress  ABD: non-distended, soft non-tender abd  EXT/MSK:  FROMx4. No lower extremity edema.  SKIN: well perfused  Neuro: Grossly intact, AOX3

## 2023-08-09 NOTE — ED PROVIDER NOTE - CLINICAL SUMMARY MEDICAL DECISION MAKING FREE TEXT BOX
patient had labs, serum hCG, transvaginal ultrasound done today.  Labs unremarkable, beta-hCG positive at 180, transvaginal ultrasound unchanged from a few days ago with fetal demise.  I discussed the case with OB/GYN, who states she does not need any emergent intervention but would benefit from further outpatient evaluation in their clinic for treatment options which could include D&C versus expectant care versus medical management.  I explained to the patient and she is happy to follow-up with as an outpatient.  There is no need for any further emergent intervention.  Patient is safe for discharge with follow-up as above, return precautions

## 2023-08-11 PROBLEM — Z78.9 OTHER SPECIFIED HEALTH STATUS: Chronic | Status: ACTIVE | Noted: 2023-08-05

## 2023-08-14 ENCOUNTER — OUTPATIENT (OUTPATIENT)
Dept: OUTPATIENT SERVICES | Facility: HOSPITAL | Age: 33
LOS: 1 days | End: 2023-08-14
Payer: COMMERCIAL

## 2023-08-14 ENCOUNTER — APPOINTMENT (OUTPATIENT)
Dept: OBGYN | Facility: CLINIC | Age: 33
End: 2023-08-14
Payer: MEDICAID

## 2023-08-14 VITALS
WEIGHT: 126.99 LBS | HEIGHT: 69 IN | RESPIRATION RATE: 18 BRPM | HEART RATE: 81 BPM | SYSTOLIC BLOOD PRESSURE: 111 MMHG | OXYGEN SATURATION: 100 % | TEMPERATURE: 99 F | DIASTOLIC BLOOD PRESSURE: 64 MMHG

## 2023-08-14 VITALS
HEIGHT: 69 IN | RESPIRATION RATE: 22 BRPM | SYSTOLIC BLOOD PRESSURE: 111 MMHG | BODY MASS INDEX: 18.81 KG/M2 | HEART RATE: 88 BPM | DIASTOLIC BLOOD PRESSURE: 64 MMHG | WEIGHT: 127 LBS

## 2023-08-14 DIAGNOSIS — O03.4 INCOMPLETE SPONTANEOUS ABORTION W/OUT COMPLICATION: ICD-10-CM

## 2023-08-14 DIAGNOSIS — Z98.891 HISTORY OF UTERINE SCAR FROM PREVIOUS SURGERY: Chronic | ICD-10-CM

## 2023-08-14 DIAGNOSIS — Z86.2 PERSONAL HISTORY OF DISEASES OF THE BLOOD AND BLOOD-FORMING ORGANS AND CERTAIN DISORDERS INVOLVING THE IMMUNE MECHANISM: ICD-10-CM

## 2023-08-14 DIAGNOSIS — Z33.2 ENCOUNTER FOR ELECTIVE TERMINATION OF PREGNANCY: ICD-10-CM

## 2023-08-14 DIAGNOSIS — F12.90 CANNABIS USE, UNSPECIFIED, UNCOMPLICATED: ICD-10-CM

## 2023-08-14 DIAGNOSIS — Z34.90 ENCOUNTER FOR SUPERVISION OF NORMAL PREGNANCY, UNSPECIFIED, UNSPECIFIED TRIMESTER: ICD-10-CM

## 2023-08-14 DIAGNOSIS — Z78.9 OTHER SPECIFIED HEALTH STATUS: ICD-10-CM

## 2023-08-14 DIAGNOSIS — Z01.818 ENCOUNTER FOR OTHER PREPROCEDURAL EXAMINATION: ICD-10-CM

## 2023-08-14 LAB
ANION GAP SERPL CALC-SCNC: 11 MMOL/L — SIGNIFICANT CHANGE UP (ref 5–17)
BLD GP AB SCN SERPL QL: NEGATIVE — SIGNIFICANT CHANGE UP
BUN SERPL-MCNC: 8 MG/DL — SIGNIFICANT CHANGE UP (ref 7–23)
CALCIUM SERPL-MCNC: 9.5 MG/DL — SIGNIFICANT CHANGE UP (ref 8.4–10.5)
CHLORIDE SERPL-SCNC: 103 MMOL/L — SIGNIFICANT CHANGE UP (ref 96–108)
CO2 SERPL-SCNC: 25 MMOL/L — SIGNIFICANT CHANGE UP (ref 22–31)
CREAT SERPL-MCNC: 0.83 MG/DL — SIGNIFICANT CHANGE UP (ref 0.5–1.3)
EGFR: 97 ML/MIN/1.73M2 — SIGNIFICANT CHANGE UP
GLUCOSE SERPL-MCNC: 82 MG/DL — SIGNIFICANT CHANGE UP (ref 70–99)
HCT VFR BLD CALC: 38.6 % — SIGNIFICANT CHANGE UP (ref 34.5–45)
HGB BLD-MCNC: 12 G/DL — SIGNIFICANT CHANGE UP (ref 11.5–15.5)
MCHC RBC-ENTMCNC: 29.5 PG — SIGNIFICANT CHANGE UP (ref 27–34)
MCHC RBC-ENTMCNC: 31.1 GM/DL — LOW (ref 32–36)
MCV RBC AUTO: 94.8 FL — SIGNIFICANT CHANGE UP (ref 80–100)
NRBC # BLD: 0 /100 WBCS — SIGNIFICANT CHANGE UP (ref 0–0)
PLATELET # BLD AUTO: 401 K/UL — HIGH (ref 150–400)
POTASSIUM SERPL-MCNC: 3.8 MMOL/L — SIGNIFICANT CHANGE UP (ref 3.5–5.3)
POTASSIUM SERPL-SCNC: 3.8 MMOL/L — SIGNIFICANT CHANGE UP (ref 3.5–5.3)
RBC # BLD: 4.07 M/UL — SIGNIFICANT CHANGE UP (ref 3.8–5.2)
RBC # FLD: 12.7 % — SIGNIFICANT CHANGE UP (ref 10.3–14.5)
RH IG SCN BLD-IMP: POSITIVE — SIGNIFICANT CHANGE UP
SODIUM SERPL-SCNC: 139 MMOL/L — SIGNIFICANT CHANGE UP (ref 135–145)
WBC # BLD: 6.85 K/UL — SIGNIFICANT CHANGE UP (ref 3.8–10.5)
WBC # FLD AUTO: 6.85 K/UL — SIGNIFICANT CHANGE UP (ref 3.8–10.5)

## 2023-08-14 PROCEDURE — 99204 OFFICE O/P NEW MOD 45 MIN: CPT | Mod: 25

## 2023-08-14 PROCEDURE — 86850 RBC ANTIBODY SCREEN: CPT

## 2023-08-14 PROCEDURE — G0463: CPT

## 2023-08-14 PROCEDURE — 86901 BLOOD TYPING SEROLOGIC RH(D): CPT

## 2023-08-14 PROCEDURE — 86900 BLOOD TYPING SEROLOGIC ABO: CPT

## 2023-08-14 PROCEDURE — 80048 BASIC METABOLIC PNL TOTAL CA: CPT

## 2023-08-14 PROCEDURE — 76817 TRANSVAGINAL US OBSTETRIC: CPT

## 2023-08-14 PROCEDURE — 87086 URINE CULTURE/COLONY COUNT: CPT

## 2023-08-14 PROCEDURE — 85027 COMPLETE CBC AUTOMATED: CPT

## 2023-08-14 NOTE — H&P PST ADULT - NS SC CAGE ALCOHOL CUT DOWN
I sent some phenergan but if she continues to have problems, she needs to be seen.
Patient states can't take zofran due to itching all over. Requesting different medication be sent to pharmacy.
Detail Level: Detailed
Detail Level: Generalized
no

## 2023-08-14 NOTE — H&P PST ADULT - BIRTH SEX
Alert and oriented, inconsistent and changing exam, cn II - XII intact, no pronator drift however drops left arm or right arm on 2 separate occasions, sensation equal b/l. lifts both legs at hips against gravity for 10 seconds Female

## 2023-08-14 NOTE — H&P PST ADULT - ASSESSMENT
DASI score: 7.44 mets   DASI activity: walk 1 mile, climb stairs, cares for 7 month old   Loose teeth or denture: no    MP      DASI score: 7.44 mets   DASI activity: walk 1 mile, climb stairs, cares for 7 month old   Loose teeth or denture: no    MP 1

## 2023-08-14 NOTE — H&P PST ADULT - NSICDXPASTMEDICALHX_GEN_ALL_CORE_FT
PAST MEDICAL HISTORY:  Retained portions of placenta and membranes without hemorrhage      PAST MEDICAL HISTORY:  Anemia     Retained portions of placenta and membranes without hemorrhage
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
## 2023-08-14 NOTE — H&P PST ADULT - PROBLEM SELECTOR PLAN 2
Pt instructed to stop using marijuana at least 24 hours prior to surgery. Pt verbalized understanding.

## 2023-08-15 ENCOUNTER — APPOINTMENT (OUTPATIENT)
Dept: ANTEPARTUM | Facility: CLINIC | Age: 33
End: 2023-08-15
Payer: MEDICAID

## 2023-08-15 ENCOUNTER — APPOINTMENT (OUTPATIENT)
Dept: OBGYN | Facility: CLINIC | Age: 33
End: 2023-08-15

## 2023-08-15 ENCOUNTER — TRANSCRIPTION ENCOUNTER (OUTPATIENT)
Age: 33
End: 2023-08-15

## 2023-08-15 ENCOUNTER — ASOB RESULT (OUTPATIENT)
Age: 33
End: 2023-08-15

## 2023-08-15 ENCOUNTER — INPATIENT (INPATIENT)
Facility: HOSPITAL | Age: 33
LOS: 1 days | Discharge: HOME | DRG: 818 | End: 2023-08-17
Attending: OBSTETRICS & GYNECOLOGY | Admitting: OBSTETRICS & GYNECOLOGY
Payer: COMMERCIAL

## 2023-08-15 VITALS
SYSTOLIC BLOOD PRESSURE: 116 MMHG | TEMPERATURE: 98 F | DIASTOLIC BLOOD PRESSURE: 54 MMHG | RESPIRATION RATE: 18 BRPM | WEIGHT: 110.01 LBS | OXYGEN SATURATION: 99 % | HEIGHT: 69 IN | HEART RATE: 100 BPM

## 2023-08-15 DIAGNOSIS — O36.80X0 PREGNANCY WITH INCONCLUSIVE FETAL VIABILITY, NOT APPLICABLE OR UNSPECIFIED: ICD-10-CM

## 2023-08-15 DIAGNOSIS — Z98.891 HISTORY OF UTERINE SCAR FROM PREVIOUS SURGERY: Chronic | ICD-10-CM

## 2023-08-15 LAB
ALBUMIN SERPL ELPH-MCNC: 4.4 G/DL — SIGNIFICANT CHANGE UP (ref 3.3–5)
ALP SERPL-CCNC: 67 U/L — SIGNIFICANT CHANGE UP (ref 40–120)
ALT FLD-CCNC: 7 U/L — LOW (ref 10–45)
ANION GAP SERPL CALC-SCNC: 15 MMOL/L — SIGNIFICANT CHANGE UP (ref 5–17)
APTT BLD: 31.7 SEC — SIGNIFICANT CHANGE UP (ref 24.5–35.6)
AST SERPL-CCNC: 17 U/L — SIGNIFICANT CHANGE UP (ref 10–40)
BASOPHILS # BLD AUTO: 0.01 K/UL — SIGNIFICANT CHANGE UP (ref 0–0.2)
BASOPHILS NFR BLD AUTO: 0.1 % — SIGNIFICANT CHANGE UP (ref 0–2)
BILIRUB SERPL-MCNC: 0.7 MG/DL — SIGNIFICANT CHANGE UP (ref 0.2–1.2)
BUN SERPL-MCNC: 13 MG/DL — SIGNIFICANT CHANGE UP (ref 7–23)
C TRACH RRNA SPEC QL NAA+PROBE: NOT DETECTED
CALCIUM SERPL-MCNC: 9.6 MG/DL — SIGNIFICANT CHANGE UP (ref 8.4–10.5)
CHLORIDE SERPL-SCNC: 101 MMOL/L — SIGNIFICANT CHANGE UP (ref 96–108)
CO2 SERPL-SCNC: 21 MMOL/L — LOW (ref 22–31)
CREAT SERPL-MCNC: 0.95 MG/DL — SIGNIFICANT CHANGE UP (ref 0.5–1.3)
CULTURE RESULTS: SIGNIFICANT CHANGE UP
EGFR: 83 ML/MIN/1.73M2 — SIGNIFICANT CHANGE UP
EOSINOPHIL # BLD AUTO: 0.01 K/UL — SIGNIFICANT CHANGE UP (ref 0–0.5)
EOSINOPHIL NFR BLD AUTO: 0.1 % — SIGNIFICANT CHANGE UP (ref 0–6)
GLUCOSE SERPL-MCNC: 65 MG/DL — LOW (ref 70–99)
HCG SERPL-ACNC: 13.1 MIU/ML — HIGH
HCT VFR BLD CALC: 38.3 % — SIGNIFICANT CHANGE UP (ref 34.5–45)
HGB BLD-MCNC: 12.4 G/DL — SIGNIFICANT CHANGE UP (ref 11.5–15.5)
IMM GRANULOCYTES NFR BLD AUTO: 0.4 % — SIGNIFICANT CHANGE UP (ref 0–0.9)
INR BLD: 1.23 RATIO — HIGH (ref 0.85–1.18)
LYMPHOCYTES # BLD AUTO: 0.86 K/UL — LOW (ref 1–3.3)
LYMPHOCYTES # BLD AUTO: 8.7 % — LOW (ref 13–44)
MCHC RBC-ENTMCNC: 29.8 PG — SIGNIFICANT CHANGE UP (ref 27–34)
MCHC RBC-ENTMCNC: 32.4 GM/DL — SIGNIFICANT CHANGE UP (ref 32–36)
MCV RBC AUTO: 92.1 FL — SIGNIFICANT CHANGE UP (ref 80–100)
MONOCYTES # BLD AUTO: 0.45 K/UL — SIGNIFICANT CHANGE UP (ref 0–0.9)
MONOCYTES NFR BLD AUTO: 4.5 % — SIGNIFICANT CHANGE UP (ref 2–14)
N GONORRHOEA RRNA SPEC QL NAA+PROBE: NOT DETECTED
NEUTROPHILS # BLD AUTO: 8.53 K/UL — HIGH (ref 1.8–7.4)
NEUTROPHILS NFR BLD AUTO: 86.2 % — HIGH (ref 43–77)
NRBC # BLD: 0 /100 WBCS — SIGNIFICANT CHANGE UP (ref 0–0)
PLATELET # BLD AUTO: 398 K/UL — SIGNIFICANT CHANGE UP (ref 150–400)
POTASSIUM SERPL-MCNC: 3.7 MMOL/L — SIGNIFICANT CHANGE UP (ref 3.5–5.3)
POTASSIUM SERPL-SCNC: 3.7 MMOL/L — SIGNIFICANT CHANGE UP (ref 3.5–5.3)
PROT SERPL-MCNC: 7.9 G/DL — SIGNIFICANT CHANGE UP (ref 6–8.3)
PROTHROM AB SERPL-ACNC: 13.4 SEC — HIGH (ref 9.5–13)
RBC # BLD: 4.16 M/UL — SIGNIFICANT CHANGE UP (ref 3.8–5.2)
RBC # FLD: 12.4 % — SIGNIFICANT CHANGE UP (ref 10.3–14.5)
SODIUM SERPL-SCNC: 137 MMOL/L — SIGNIFICANT CHANGE UP (ref 135–145)
SOURCE AMPLIFICATION: NORMAL
SPECIMEN SOURCE: SIGNIFICANT CHANGE UP
WBC # BLD: 9.9 K/UL — SIGNIFICANT CHANGE UP (ref 3.8–10.5)
WBC # FLD AUTO: 9.9 K/UL — SIGNIFICANT CHANGE UP (ref 3.8–10.5)

## 2023-08-15 PROCEDURE — 76815 OB US LIMITED FETUS(S): CPT | Mod: 26

## 2023-08-15 PROCEDURE — 76817 TRANSVAGINAL US OBSTETRIC: CPT | Mod: 26

## 2023-08-15 PROCEDURE — 76817 TRANSVAGINAL US OBSTETRIC: CPT

## 2023-08-15 PROCEDURE — 99285 EMERGENCY DEPT VISIT HI MDM: CPT

## 2023-08-15 RX ORDER — SODIUM CHLORIDE 9 MG/ML
1000 INJECTION INTRAMUSCULAR; INTRAVENOUS; SUBCUTANEOUS ONCE
Refills: 0 | Status: DISCONTINUED | OUTPATIENT
Start: 2023-08-15 | End: 2023-08-15

## 2023-08-15 RX ORDER — SODIUM CHLORIDE 9 MG/ML
1000 INJECTION, SOLUTION INTRAVENOUS
Refills: 0 | Status: DISCONTINUED | OUTPATIENT
Start: 2023-08-15 | End: 2023-08-16

## 2023-08-15 RX ORDER — SODIUM CHLORIDE 9 MG/ML
1000 INJECTION INTRAMUSCULAR; INTRAVENOUS; SUBCUTANEOUS ONCE
Refills: 0 | Status: COMPLETED | OUTPATIENT
Start: 2023-08-15 | End: 2023-08-15

## 2023-08-15 RX ORDER — IBUPROFEN 200 MG
600 TABLET ORAL EVERY 6 HOURS
Refills: 0 | Status: DISCONTINUED | OUTPATIENT
Start: 2023-08-15 | End: 2023-08-16

## 2023-08-15 RX ORDER — ONDANSETRON 8 MG/1
4 TABLET, FILM COATED ORAL ONCE
Refills: 0 | Status: COMPLETED | OUTPATIENT
Start: 2023-08-15 | End: 2023-08-15

## 2023-08-15 RX ORDER — ACETAMINOPHEN 500 MG
975 TABLET ORAL EVERY 6 HOURS
Refills: 0 | Status: DISCONTINUED | OUTPATIENT
Start: 2023-08-15 | End: 2023-08-16

## 2023-08-15 RX ADMIN — SODIUM CHLORIDE 2000 MILLILITER(S): 9 INJECTION INTRAMUSCULAR; INTRAVENOUS; SUBCUTANEOUS at 14:24

## 2023-08-15 RX ADMIN — ONDANSETRON 4 MILLIGRAM(S): 8 TABLET, FILM COATED ORAL at 18:36

## 2023-08-15 RX ADMIN — SODIUM CHLORIDE 75 MILLILITER(S): 9 INJECTION, SOLUTION INTRAVENOUS at 20:55

## 2023-08-15 NOTE — ED PROVIDER NOTE - NSICDXPASTMEDICALHX_GEN_ALL_CORE_FT
PAST MEDICAL HISTORY:  Anemia     Retained portions of placenta and membranes without hemorrhage

## 2023-08-15 NOTE — CONSULT NOTE ADULT - SUBJECTIVE AND OBJECTIVE BOX
JEANNE PUENTES  32y  Female [id number]  - Hx obtained via a combination of chart review and per patient     HPI: 33yo  @ 14.4wga (see below regarding clarification) by LMP of 2023 who presents to the ED for further evaluation of a failed medical termination of pregnancy. Patient underwent a medical termination of pregnancy on 2023 at [facility] with subsequent passage of tissue at 9.3wga. Reports passing tissue at home but ultimately did not go to a follow-up visit on . States that in the setting of persistent bleeding and pain, she went to a clinic on 2023       after a sono showed c/f retained POCs D&Cs were attempted unsuccessfully (support person at bedside stated that they had given her a medication prior to the second to attempt and dilate her cervix then). Currently w/o complaints of bleeding. Intermittent cramping noted.     2023 family planning clinic sono w/ TVUS indicating a GA of 7.3wga and CRL of 11.9 w/ no FH    Name of GYN Physician: Dr. KHUSHBOO Yu     POB:    PGyn: Denies  MedHx:  SurgHx:  Meds:  Allergies:  Social: Denies any tobacco use, drug use, or alcohol use       Vital Signs Last 24 Hrs  T(C): 36.8 (15 Aug 2023 13:26), Max: 36.8 (15 Aug 2023 13:26)  T(F): 98.3 (15 Aug 2023 13:26), Max: 98.3 (15 Aug 2023 13:26)  HR: 77 (15 Aug 2023 14:40) (77 - 100)  BP: 104/60 (15 Aug 2023 14:40) (104/60 - 116/54)  BP(mean): --  RR: 15 (15 Aug 2023 14:40) (15 - 18)  SpO2: 99% (15 Aug 2023 14:40) (99% - 99%)    Parameters below as of 15 Aug 2023 14:40  Patient On (Oxygen Delivery Method): room air        Physical Exam:   General: Awake. Alert. NAD  CV: Regular rate and rhythm. No murmurs appreciated   Lungs: Clear to auscultation bilaterally. No respiratory distress   Back: No CVA tenderness  Abd: Soft, non-tender, non-distended.  Bowel sounds present.    :  No bleeding on pad.    External vulva and labia w/o lesions or skin changes seen.  Bimanual exam with no cervical motion tenderness, uterus wnl, adnexa non palpable b/l.  Cervix closed vs. Cervix dilated    cm   Speculum Exam: No active bleeding from os.  Physiologic discharge.    Ext: No calf tenderness or edema bilaterally    LABS:                              12.0   6.85  )-----------( 401      ( 14 Aug 2023 10:02 )             38.6     08-14    139  |  103  |  8   ----------------------------<  82  3.8   |  25  |  0.83    Ca    9.5      14 Aug 2023 10:02      I&O's Detail      Urinalysis Basic - ( 14 Aug 2023 10:02 )    Color: x / Appearance: x / SG: x / pH: x  Gluc: 82 mg/dL / Ketone: x  / Bili: x / Urobili: x   Blood: x / Protein: x / Nitrite: x   Leuk Esterase: x / RBC: x / WBC x   Sq Epi: x / Non Sq Epi: x / Bacteria: x        RADIOLOGY & ADDITIONAL STUDIES:    incomplete  JEANNE PUENTES  32y  Female [id number]  - Hx obtained via a combination of chart review and per patient     HPI: 33yo  @ 14.4wga (see below regarding clarification) by LMP of 2023 who presents to the ED for further evaluation of a failed medical termination of pregnancy. Patient underwent a medical termination of pregnancy on 2023 at [facility] with subsequent passage of tissue at 9.3wga. Reports passing tissue at home but ultimately did not go to a follow-up visit on . States that in the setting of persistent bleeding and pain, she went to a clinic on 2023       after a sono showed c/f retained POCs D&Cs were attempted unsuccessfully (support person at bedside stated that they had given her a medication prior to the second to attempt and dilate her cervix then). Currently w/o complaints of bleeding. Intermittent cramping noted.     2023 family planning clinic sono w/ TVUS indicating a GA of 7.3wga and CRL of 11.9 w/ no FH    Name of GYN Physician: Dr. KHUSHBOO Yu     POB:    PGyn: Denies  MedHx:  SurgHx:  Meds:  Allergies:  Social: Denies any tobacco use, drug use, or alcohol use       Vital Signs Last 24 Hrs  T(C): 36.8 (15 Aug 2023 13:26), Max: 36.8 (15 Aug 2023 13:26)  T(F): 98.3 (15 Aug 2023 13:26), Max: 98.3 (15 Aug 2023 13:26)  HR: 77 (15 Aug 2023 14:40) (77 - 100)  BP: 104/60 (15 Aug 2023 14:40) (104/60 - 116/54)  BP(mean): --  RR: 15 (15 Aug 2023 14:40) (15 - 18)  SpO2: 99% (15 Aug 2023 14:40) (99% - 99%)    Parameters below as of 15 Aug 2023 14:40  Patient On (Oxygen Delivery Method): room air        Physical Exam:   General: Awake. Alert. NAD  CV: Regular rate and rhythm. No murmurs appreciated   Lungs: Clear to auscultation bilaterally. No respiratory distress   Back: No CVA tenderness  Abd: Soft, non-tender, non-distended.  Bowel sounds present.    :  No bleeding on pad.    External vulva and labia w/o lesions or skin changes seen.  Bimanual exam with no cervical motion tenderness, uterus wnl, adnexa non palpable b/l.  Cervix closed vs. Cervix dilated    cm   Speculum Exam: No active bleeding from os.  Physiologic discharge.    Ext: No calf tenderness or edema bilaterally    LABS:                              12.0   6.85  )-----------( 401      ( 14 Aug 2023 10:02 )             38.6     08-14    139  |  103  |  8   ----------------------------<  82  3.8   |  25  |  0.83    Ca    9.5      14 Aug 2023 10:02      I&O's Detail      Urinalysis Basic - ( 14 Aug 2023 10:02 )    Color: x / Appearance: x / SG: x / pH: x  Gluc: 82 mg/dL / Ketone: x  / Bili: x / Urobili: x   Blood: x / Protein: x / Nitrite: x   Leuk Esterase: x / RBC: x / WBC x   Sq Epi: x / Non Sq Epi: x / Bacteria: x        RADIOLOGY & ADDITIONAL STUDIES:    incomplete  JEANNE PUENTES  32y  Female [id number]  - Hx obtained via a combination of chart review and per patient     HPI: 31yo  @ 14.4wga (see below regarding clarification) by LMP of 2023 who presents to the ED for further evaluation of a failed medical termination of pregnancy. Patient underwent a medical termination of pregnancy on 2023 at [facility] with subsequent passage of tissue at 9.3wga. Reports passing tissue at home but ultimately did not go to a follow-up visit on . States that in the setting of persistent bleeding and pain, she went to a clinic on 2023       after a sono showed c/f retained POCs D&Cs were attempted unsuccessfully (support person at bedside stated that they had given her a medication prior to the second to attempt and dilate her cervix then). Currently w/o complaints of bleeding. Intermittent cramping noted.     2023 family planning clinic sono w/ TVUS indicating a GA of 7.3wga and CRL of 11.9 w/ no FH    Name of GYN Physician: Dr. KHUSHBOO Yu     POB:    PGyn: Denies  MedHx:  SurgHx:  Meds:  Allergies:  Social: Denies any tobacco use, drug use, or alcohol use       Vital Signs Last 24 Hrs  T(C): 36.8 (15 Aug 2023 13:26), Max: 36.8 (15 Aug 2023 13:26)  T(F): 98.3 (15 Aug 2023 13:26), Max: 98.3 (15 Aug 2023 13:26)  HR: 77 (15 Aug 2023 14:40) (77 - 100)  BP: 104/60 (15 Aug 2023 14:40) (104/60 - 116/54)  BP(mean): --  RR: 15 (15 Aug 2023 14:40) (15 - 18)  SpO2: 99% (15 Aug 2023 14:40) (99% - 99%)    Parameters below as of 15 Aug 2023 14:40  Patient On (Oxygen Delivery Method): room air        Physical Exam:   General: Awake. Alert. NAD  CV: Regular rate and rhythm. No murmurs appreciated   Lungs: Clear to auscultation bilaterally. No respiratory distress   Back: No CVA tenderness  Abd: Soft, non-tender, non-distended.  Bowel sounds present.    :  No bleeding on pad.    External vulva and labia w/o lesions or skin changes seen.  Bimanual exam with no cervical motion tenderness, uterus wnl, adnexa non palpable b/l.  Cervix closed vs. Cervix dilated    cm   Speculum Exam: No active bleeding from os.  Physiologic discharge.    Ext: No calf tenderness or edema bilaterally    LABS:                              12.0   6.85  )-----------( 401      ( 14 Aug 2023 10:02 )             38.6     08-14    139  |  103  |  8   ----------------------------<  82  3.8   |  25  |  0.83    Ca    9.5      14 Aug 2023 10:02      I&O's Detail      Urinalysis Basic - ( 14 Aug 2023 10:02 )    Color: x / Appearance: x / SG: x / pH: x  Gluc: 82 mg/dL / Ketone: x  / Bili: x / Urobili: x   Blood: x / Protein: x / Nitrite: x   Leuk Esterase: x / RBC: x / WBC x   Sq Epi: x / Non Sq Epi: x / Bacteria: x        RADIOLOGY & ADDITIONAL STUDIES:    incomplete  JEANNE PUENTES  32y  Female [id number]  - Hx obtained via a combination of chart review and per patient     HPI: 33yo  @ 14.4wga (see below regarding clarification) by LMP of 2023 who presents to the ED for further evaluation of a failed medical termination of pregnancy. Patient underwent a medical termination of pregnancy on 2023 at [facility] with subsequent passage of tissue at 9.3wga. Reports passing tissue at home but ultimately did not go to a follow-up visit on . Ultimately followed up on  and was referred to [facility] given concerns of retained POCs. TVUS obtained then showed a IUP w/ EGA of 9.5wga w/ no FH        after a sono showed c/f retained POCs D&Cs were attempted unsuccessfully (support person at bedside stated that they had given her a medication prior to the second to attempt and dilate her cervix then). Currently w/o complaints of bleeding. Intermittent cramping noted.     2023 family planning clinic sono w/ TVUS indicating a GA of 7.3wga and CRL of 11.9 w/ no FH    Name of GYN Physician: Dr. KHUSHBOO Yu     POB:    PGyn: Denies  MedHx:  SurgHx:  Meds:  Allergies:  Social: Denies any tobacco use, drug use, or alcohol use       Vital Signs Last 24 Hrs  T(C): 36.8 (15 Aug 2023 13:26), Max: 36.8 (15 Aug 2023 13:26)  T(F): 98.3 (15 Aug 2023 13:26), Max: 98.3 (15 Aug 2023 13:26)  HR: 77 (15 Aug 2023 14:40) (77 - 100)  BP: 104/60 (15 Aug 2023 14:40) (104/60 - 116/54)  BP(mean): --  RR: 15 (15 Aug 2023 14:40) (15 - 18)  SpO2: 99% (15 Aug 2023 14:40) (99% - 99%)    Parameters below as of 15 Aug 2023 14:40  Patient On (Oxygen Delivery Method): room air        Physical Exam:   General: Awake. Alert. NAD  CV: Regular rate and rhythm. No murmurs appreciated   Lungs: Clear to auscultation bilaterally. No respiratory distress   Back: No CVA tenderness  Abd: Soft, non-tender, non-distended.  Bowel sounds present.    :  No bleeding on pad.    External vulva and labia w/o lesions or skin changes seen.  Bimanual exam with no cervical motion tenderness, uterus wnl, adnexa non palpable b/l.  Cervix closed vs. Cervix dilated    cm   Speculum Exam: No active bleeding from os.  Physiologic discharge.    Ext: No calf tenderness or edema bilaterally    LABS:                              12.0   6.85  )-----------( 401      ( 14 Aug 2023 10:02 )             38.6     08-14    139  |  103  |  8   ----------------------------<  82  3.8   |  25  |  0.83    Ca    9.5      14 Aug 2023 10:02      I&O's Detail      Urinalysis Basic - ( 14 Aug 2023 10:02 )    Color: x / Appearance: x / SG: x / pH: x  Gluc: 82 mg/dL / Ketone: x  / Bili: x / Urobili: x   Blood: x / Protein: x / Nitrite: x   Leuk Esterase: x / RBC: x / WBC x   Sq Epi: x / Non Sq Epi: x / Bacteria: x        RADIOLOGY & ADDITIONAL STUDIES:    incomplete  JEANNE PUENTES  32y  Female [id number]  - Hx obtained via a combination of chart review and per patient     HPI: 33yo  @ 14.4wga (see below regarding clarification) by LMP of 2023 who presents to the ED for further evaluation of a failed medical termination of pregnancy. Patient underwent a medical termination of pregnancy on 2023 at [facility] with subsequent passage of tissue at 9.3wga. Reports passing tissue at home but ultimately did not go to a follow-up visit on . Ultimately followed up on  and was referred to [facility] given concerns of retained POCs. TVUS obtained then showed a IUP w/ EGA of 9.5wga w/ no FH        after a sono showed c/f retained POCs D&Cs were attempted unsuccessfully (support person at bedside stated that they had given her a medication prior to the second to attempt and dilate her cervix then). Currently w/o complaints of bleeding. Intermittent cramping noted.     2023 family planning clinic sono w/ TVUS indicating a GA of 7.3wga and CRL of 11.9 w/ no FH    Name of GYN Physician: Dr. KHUSHBOO Yu     POB:    PGyn: Denies  MedHx:  SurgHx:  Meds:  Allergies:  Social: Denies any tobacco use, drug use, or alcohol use       Vital Signs Last 24 Hrs  T(C): 36.8 (15 Aug 2023 13:26), Max: 36.8 (15 Aug 2023 13:26)  T(F): 98.3 (15 Aug 2023 13:26), Max: 98.3 (15 Aug 2023 13:26)  HR: 77 (15 Aug 2023 14:40) (77 - 100)  BP: 104/60 (15 Aug 2023 14:40) (104/60 - 116/54)  BP(mean): --  RR: 15 (15 Aug 2023 14:40) (15 - 18)  SpO2: 99% (15 Aug 2023 14:40) (99% - 99%)    Parameters below as of 15 Aug 2023 14:40  Patient On (Oxygen Delivery Method): room air        Physical Exam:   General: Awake. Alert. NAD  CV: Regular rate and rhythm. No murmurs appreciated   Lungs: Clear to auscultation bilaterally. No respiratory distress   Back: No CVA tenderness  Abd: Soft, non-tender, non-distended.  Bowel sounds present.    :  No bleeding on pad.    External vulva and labia w/o lesions or skin changes seen.  Bimanual exam with no cervical motion tenderness, uterus wnl, adnexa non palpable b/l.  Cervix closed vs. Cervix dilated    cm   Speculum Exam: No active bleeding from os.  Physiologic discharge.    Ext: No calf tenderness or edema bilaterally    LABS:                              12.0   6.85  )-----------( 401      ( 14 Aug 2023 10:02 )             38.6     08-14    139  |  103  |  8   ----------------------------<  82  3.8   |  25  |  0.83    Ca    9.5      14 Aug 2023 10:02      I&O's Detail      Urinalysis Basic - ( 14 Aug 2023 10:02 )    Color: x / Appearance: x / SG: x / pH: x  Gluc: 82 mg/dL / Ketone: x  / Bili: x / Urobili: x   Blood: x / Protein: x / Nitrite: x   Leuk Esterase: x / RBC: x / WBC x   Sq Epi: x / Non Sq Epi: x / Bacteria: x        RADIOLOGY & ADDITIONAL STUDIES:    incomplete  JEANNE PUENTES  32y  Female [id number]  - Hx obtained via a combination of chart review and per patient     HPI: 33yo  @ 14.4wga (see below regarding clarification) by LMP of 2023 who presents to the ED for further evaluation of a failed medical termination of pregnancy. Patient underwent a medical termination of pregnancy on 2023 at [facility] with subsequent passage of tissue at 9.3wga. Reports passing tissue at home but ultimately did not go to a follow-up visit on . Ultimately followed up on  and was referred to [facility] given concerns of retained POCs. TVUS obtained then showed a IUP w/ EGA of 9.5wga w/ no FH        after a sono showed c/f retained POCs D&Cs were attempted unsuccessfully (support person at bedside stated that they had given her a medication prior to the second to attempt and dilate her cervix then). Currently w/o complaints of bleeding. Intermittent cramping noted.     2023 family planning clinic sono w/ TVUS indicating a GA of 7.3wga and CRL of 11.9 w/ no FH    Name of GYN Physician: Dr. KHUSHBOO Yu     POB:    PGyn: Denies  MedHx:  SurgHx:  Meds:  Allergies:  Social: Denies any tobacco use, drug use, or alcohol use       Vital Signs Last 24 Hrs  T(C): 36.8 (15 Aug 2023 13:26), Max: 36.8 (15 Aug 2023 13:26)  T(F): 98.3 (15 Aug 2023 13:26), Max: 98.3 (15 Aug 2023 13:26)  HR: 77 (15 Aug 2023 14:40) (77 - 100)  BP: 104/60 (15 Aug 2023 14:40) (104/60 - 116/54)  BP(mean): --  RR: 15 (15 Aug 2023 14:40) (15 - 18)  SpO2: 99% (15 Aug 2023 14:40) (99% - 99%)    Parameters below as of 15 Aug 2023 14:40  Patient On (Oxygen Delivery Method): room air        Physical Exam:   General: Awake. Alert. NAD  CV: Regular rate and rhythm. No murmurs appreciated   Lungs: Clear to auscultation bilaterally. No respiratory distress   Back: No CVA tenderness  Abd: Soft, non-tender, non-distended.  Bowel sounds present.    :  No bleeding on pad.    External vulva and labia w/o lesions or skin changes seen.  Bimanual exam with no cervical motion tenderness, uterus wnl, adnexa non palpable b/l.  Cervix closed vs. Cervix dilated    cm   Speculum Exam: No active bleeding from os.  Physiologic discharge.    Ext: No calf tenderness or edema bilaterally    LABS:                              12.0   6.85  )-----------( 401      ( 14 Aug 2023 10:02 )             38.6     08-14    139  |  103  |  8   ----------------------------<  82  3.8   |  25  |  0.83    Ca    9.5      14 Aug 2023 10:02      I&O's Detail      Urinalysis Basic - ( 14 Aug 2023 10:02 )    Color: x / Appearance: x / SG: x / pH: x  Gluc: 82 mg/dL / Ketone: x  / Bili: x / Urobili: x   Blood: x / Protein: x / Nitrite: x   Leuk Esterase: x / RBC: x / WBC x   Sq Epi: x / Non Sq Epi: x / Bacteria: x        RADIOLOGY & ADDITIONAL STUDIES:    incomplete  JEANNE PUENTES  32y  Female [id number]  - Hx obtained via a combination of chart review and per patient     HPI: 33yo  @ 14.4wga (see below regarding clarification) by LMP of 2023 who presents to the ED for further evaluation of a failed medical termination of pregnancy. Patient underwent a medical termination of pregnancy on 2023 at [facility] with subsequent passage of tissue at 9.3wga. Reports passing tissue at home but ultimately did not go to a follow-up visit on . Ultimately followed up on  and was referred to [facility] given concerns of retained POCs. TVUS obtained then showed a IUP w/ EGA of 9.5wga w/ no FH. There is a hand-written note scanned from [facility] that states that a D&C was attempted under ultrasound guidance which was unsuccessful. Currently w/o complaints of bleeding. Intermittent cramping noted.     Patient was initially scheduled for a D&C today but was scanned today by [facility] and there was concern for interstitial/cornual pregnancy and/or placental accreta spectrum     Name of GYN Physician: [facility] family planning clinic     POB: Reports x2 full term pregnancies ( and , both of which were c/s). Prior documentation noted that the first may have been pre-term  PGyn: Above  MedHx: Denies   SurgHx: Above, D&C x2 as above   Meds: None  Allergies: NKDA  Social: Marijuana use       Vital Signs Last 24 Hrs  T(C): 36.8 (15 Aug 2023 13:26), Max: 36.8 (15 Aug 2023 13:26)  T(F): 98.3 (15 Aug 2023 13:26), Max: 98.3 (15 Aug 2023 13:26)  HR: 77 (15 Aug 2023 14:40) (77 - 100)  BP: 104/60 (15 Aug 2023 14:40) (104/60 - 116/54)  BP(mean): --  RR: 15 (15 Aug 2023 14:40) (15 - 18)  SpO2: 99% (15 Aug 2023 14:40) (99% - 99%)    Parameters below as of 15 Aug 2023 14:40  Patient On (Oxygen Delivery Method): room air        Physical Exam:   General: Awake. Alert. NAD. Sitting up and conversive w/ examiner and support person   Lungs: Unlabored breathing. No respiratory distress   Abd: Soft, non-tender, non-distended. No pain w/ jostling of stretcher    Ext: No calf tenderness bilaterally    LABS:                              12.0   6.85  )-----------( 401      ( 14 Aug 2023 10:02 )             38.6     08-14    139  |  103  |  8   ----------------------------<  82  3.8   |  25  |  0.83    Ca    9.5      14 Aug 2023 10:02      I&O's Detail      Urinalysis Basic - ( 14 Aug 2023 10:02 )    Color: x / Appearance: x / SG: x / pH: x  Gluc: 82 mg/dL / Ketone: x  / Bili: x / Urobili: x   Blood: x / Protein: x / Nitrite: x   Leuk Esterase: x / RBC: x / WBC x   Sq Epi: x / Non Sq Epi: x / Bacteria: x        RADIOLOGY & ADDITIONAL STUDIES:    incomplete  JEANNE PUENTES  32y  Female [id number]  - Hx obtained via a combination of chart review and per patient     HPI: 33yo  @ 14.4wga (see below regarding clarification) by LMP of 2023 who presents to the ED for further evaluation of a failed medical termination of pregnancy. Patient underwent a medical termination of pregnancy on 2023 at [facility] with subsequent passage of tissue at 9.3wga. Reports passing tissue at home but ultimately did not go to a follow-up visit on . Ultimately followed up on  and was referred to [facility] given concerns of retained POCs. TVUS obtained then showed a IUP w/ EGA of 9.5wga w/ no FH. There is a hand-written note scanned from [facility] that states that a D&C was attempted under ultrasound guidance which was unsuccessful. Currently w/o complaints of bleeding. Intermittent cramping noted.     Patient was initially scheduled for a D&C today but was scanned today by [facility] and there was concern for interstitial/cornual pregnancy and/or placental accreta spectrum     Name of GYN Physician: [facility] family planning clinic     POB: Reports x2 full term pregnancies ( and , both of which were c/s). Prior documentation noted that the first may have been pre-term  PGyn: Above  MedHx: Denies   SurgHx: Above, D&C x2 as above   Meds: None  Allergies: NKDA  Social: Marijuana use       Vital Signs Last 24 Hrs  T(C): 36.8 (15 Aug 2023 13:26), Max: 36.8 (15 Aug 2023 13:26)  T(F): 98.3 (15 Aug 2023 13:26), Max: 98.3 (15 Aug 2023 13:26)  HR: 77 (15 Aug 2023 14:40) (77 - 100)  BP: 104/60 (15 Aug 2023 14:40) (104/60 - 116/54)  BP(mean): --  RR: 15 (15 Aug 2023 14:40) (15 - 18)  SpO2: 99% (15 Aug 2023 14:40) (99% - 99%)    Parameters below as of 15 Aug 2023 14:40  Patient On (Oxygen Delivery Method): room air        Physical Exam:   General: Awake. Alert. NAD. Sitting up and conversive w/ examiner and support person   Lungs: Unlabored breathing. No respiratory distress   Abd: Soft, non-tender, non-distended. No pain w/ jostling of stretcher    Ext: No calf tenderness bilaterally    LABS:                              12.0   6.85  )-----------( 401      ( 14 Aug 2023 10:02 )             38.6     08-14    139  |  103  |  8   ----------------------------<  82  3.8   |  25  |  0.83    Ca    9.5      14 Aug 2023 10:02      I&O's Detail      Urinalysis Basic - ( 14 Aug 2023 10:02 )    Color: x / Appearance: x / SG: x / pH: x  Gluc: 82 mg/dL / Ketone: x  / Bili: x / Urobili: x   Blood: x / Protein: x / Nitrite: x   Leuk Esterase: x / RBC: x / WBC x   Sq Epi: x / Non Sq Epi: x / Bacteria: x        RADIOLOGY & ADDITIONAL STUDIES:    incomplete  JEANNE PUENTES  32y  Female [id number]  - Hx obtained via a combination of chart review and per patient     HPI: 31yo  @ 14.4wga (see below regarding clarification) by LMP of 2023 who presents to the ED for further evaluation of a failed medical termination of pregnancy. Patient underwent a medical termination of pregnancy on 2023 at [facility] with subsequent passage of tissue at 9.3wga. Reports passing tissue at home but ultimately did not go to a follow-up visit on . Ultimately followed up on  and was referred to [facility] given concerns of retained POCs. TVUS obtained then showed a IUP w/ EGA of 9.5wga w/ no FH. There is a hand-written note scanned from [facility] that states that a D&C was attempted under ultrasound guidance which was unsuccessful. Currently w/o complaints of bleeding. Intermittent cramping noted.     Patient was initially scheduled for a D&C today but was scanned today by [facility] and there was concern for interstitial/cornual pregnancy and/or placental accreta spectrum     Name of GYN Physician: [facility] family planning clinic     POB: Reports x2 full term pregnancies ( and , both of which were c/s). Prior documentation noted that the first may have been pre-term  PGyn: Above  MedHx: Denies   SurgHx: Above, D&C x2 as above   Meds: None  Allergies: NKDA  Social: Marijuana use       Vital Signs Last 24 Hrs  T(C): 36.8 (15 Aug 2023 13:26), Max: 36.8 (15 Aug 2023 13:26)  T(F): 98.3 (15 Aug 2023 13:26), Max: 98.3 (15 Aug 2023 13:26)  HR: 77 (15 Aug 2023 14:40) (77 - 100)  BP: 104/60 (15 Aug 2023 14:40) (104/60 - 116/54)  BP(mean): --  RR: 15 (15 Aug 2023 14:40) (15 - 18)  SpO2: 99% (15 Aug 2023 14:40) (99% - 99%)    Parameters below as of 15 Aug 2023 14:40  Patient On (Oxygen Delivery Method): room air        Physical Exam:   General: Awake. Alert. NAD. Sitting up and conversive w/ examiner and support person   Lungs: Unlabored breathing. No respiratory distress   Abd: Soft, non-tender, non-distended. No pain w/ jostling of stretcher    Ext: No calf tenderness bilaterally    LABS:                              12.0   6.85  )-----------( 401      ( 14 Aug 2023 10:02 )             38.6     08-14    139  |  103  |  8   ----------------------------<  82  3.8   |  25  |  0.83    Ca    9.5      14 Aug 2023 10:02      I&O's Detail      Urinalysis Basic - ( 14 Aug 2023 10:02 )    Color: x / Appearance: x / SG: x / pH: x  Gluc: 82 mg/dL / Ketone: x  / Bili: x / Urobili: x   Blood: x / Protein: x / Nitrite: x   Leuk Esterase: x / RBC: x / WBC x   Sq Epi: x / Non Sq Epi: x / Bacteria: x        RADIOLOGY & ADDITIONAL STUDIES:    incomplete  JEANNE PUENTES  32y  Female [id number]  - Hx obtained via a combination of chart review and per patient     HPI: 31yo  @ 14.4wga (see below regarding clarification) by LMP of 2023 who presents to the ED for further evaluation of a failed medical termination of pregnancy. Patient underwent a medical termination of pregnancy on 2023 at [facility] with subsequent passage of tissue at 9.3wga. Reports passing tissue at home but ultimately did not go to a follow-up visit on . Ultimately followed up on  and was referred to [facility] given concerns of retained POCs. TVUS obtained then showed a IUP w/ EGA of 9.5wga w/ no FH. There is a hand-written note scanned from [facility] that states that a D&C was attempted under ultrasound guidance which was unsuccessful. Currently w/o complaints of bleeding. Intermittent cramping noted.     Patient was initially scheduled for a D&C today but was scanned today by [facility] and there was concern for interstitial/cornual pregnancy and/or placental accreta spectrum     Name of GYN Physician: [facility]     POB: Reports x2 full term pregnancies ( and , both of which were c/s). Prior documentation noted that the first may have been pre-term  PGyn: Above  MedHx: Denies   SurgHx: Above, D&C x2 as above   Meds: None  Allergies: NKDA  Social: Marijuana use       Vital Signs Last 24 Hrs  T(C): 36.8 (15 Aug 2023 13:26), Max: 36.8 (15 Aug 2023 13:26)  T(F): 98.3 (15 Aug 2023 13:26), Max: 98.3 (15 Aug 2023 13:26)  HR: 77 (15 Aug 2023 14:40) (77 - 100)  BP: 104/60 (15 Aug 2023 14:40) (104/60 - 116/54)  BP(mean): --  RR: 15 (15 Aug 2023 14:40) (15 - 18)  SpO2: 99% (15 Aug 2023 14:40) (99% - 99%)    Parameters below as of 15 Aug 2023 14:40  Patient On (Oxygen Delivery Method): room air        Physical Exam:   General: Awake. Alert. NAD. Sitting up and conversive w/ examiner and support person   Lungs: Unlabored breathing. No respiratory distress   Abd: Soft, non-tender, non-distended. No pain w/ jostling of stretcher    Ext: No calf tenderness bilaterally    LABS:                              12.0   6.85  )-----------( 401      ( 14 Aug 2023 10:02 )             38.6     08-14    139  |  103  |  8   ----------------------------<  82  3.8   |  25  |  0.83    Ca    9.5      14 Aug 2023 10:02      I&O's Detail      Urinalysis Basic - ( 14 Aug 2023 10:02 )    Color: x / Appearance: x / SG: x / pH: x  Gluc: 82 mg/dL / Ketone: x  / Bili: x / Urobili: x   Blood: x / Protein: x / Nitrite: x   Leuk Esterase: x / RBC: x / WBC x   Sq Epi: x / Non Sq Epi: x / Bacteria: x        RADIOLOGY & ADDITIONAL STUDIES:    < from: US Transvaginal, OB (08.15.23 @ 16:00) >  ACC: [id number] EXAM:  US 3D RENDERING W WORKSTATION   ORDERED BY: PJ ESTRADA     ACC: [id number] EXAM:  US OB TRANSVAGINAL   ORDERED BY: PJ ESTRADA     PROCEDURE DATE:  08/15/2023          INTERPRETATION:  CLINICAL INFORMATION: Knownfetal demise. Concern for   cornual or interstitial ectopic pregnancy.    LMP: 2023    Estimated Gestational Age by LMP: 14 weeks and 3 days    COMPARISON: Pelvic ultrasound 2023.    Endovaginal and transabdominal pelvic sonogram. 3-D sonography was also   performed.    FINDINGS:  Uterus: Retroverted. Measures 9.2 x 6.2 x 6.8 cm. A single intrauterine   gestation is again identified, as detailed below.    Gestational Sac Size (mean): 4.4 cm  Gestational Sac Shape: Rounded with mildly irregular margin  Crown Rump Length: 2.46 cm  Estimated Gestational Age: 9 weeks 1 day by crown rump length.  Yolk Sac: Not visualized.  Fetal Heart Rate: None detected.    The gestation appears to be in a high left lateral location with markedly   thinned overlying myometrium, raising suspicion for an interstitial   ectopic pregnancy. Question division of the endometrium near the fundus,   raising the possibility for a uterine anomaly such as septate uterus,   though this is thought to be unlikely.    Right ovary: 4.2 cm x 1.4 cm x 2.0 cm. Within normal limits.  Left ovary: 2.3 cm x 1.7 cm x 1.6 cm. Within normal limits.    Fluid: Trace free fluid in the pelvis.    IMPRESSION:  Redemonstrated fetal demise. The gestation appears to be in a high left   lateral location with markedly thinned overlying myometrium, suspicious   for an interstitial ectopic pregnancy.    Questionable division of the endometrium near the fundus, raising the   possibility for uterine anomaly such as septate uterus, though this is   thought to be unlikely.    Consider pelvic MRI for further evaluation.      Findings were discussed with Dr. Yunior Armstrong 8/15/2023 5:16 PM by Dr. Coley with read back confirmation.    --- End of Report ---            RADHA BARROSO MD; Attending Radiologist  This document has been electronically signed. Aug 15 2023  5:23PM    < end of copied text >   JEANNE PUENTES  32y  Female [id number]  - Hx obtained via a combination of chart review and per patient     HPI: 31yo  @ 14.4wga (see below regarding clarification) by LMP of 2023 who presents to the ED for further evaluation of a failed medical termination of pregnancy. Patient underwent a medical termination [facility] with subsequent passage of tissue at 9.3wga. Reports passing tissue at home but ultimately did not go to a follow-up visit on . Ultimately followed up on  and was referred to [facility] given concerns of retained POCs. TVUS obtained then showed a IUP w/ EGA of 9.5wga w/ no FH. There is a hand-written note scanned from [facility] that states that a D&C was attempted under ultrasound guidance which was unsuccessful. Currently w/o complaints of bleeding. Intermittent cramping noted.     Patient was initially scheduled for a D&C today but was scanned today by [facility] and there was concern for interstitial/cornual pregnancy and/or placental accreta spectrum     Name of GYN Physician: [facility]     POB: Reports x2 full term pregnancies ( and , both of which were c/s). Prior documentation noted that the first may have been pre-term  PGyn: Above  MedHx: Denies   SurgHx: Above, D&C x2 as above   Meds: None  Allergies: NKDA  Social: Marijuana use       Vital Signs Last 24 Hrs  T(C): 36.8 (15 Aug 2023 13:26), Max: 36.8 (15 Aug 2023 13:26)  T(F): 98.3 (15 Aug 2023 13:26), Max: 98.3 (15 Aug 2023 13:26)  HR: 77 (15 Aug 2023 14:40) (77 - 100)  BP: 104/60 (15 Aug 2023 14:40) (104/60 - 116/54)  BP(mean): --  RR: 15 (15 Aug 2023 14:40) (15 - 18)  SpO2: 99% (15 Aug 2023 14:40) (99% - 99%)    Parameters below as of 15 Aug 2023 14:40  Patient On (Oxygen Delivery Method): room air        Physical Exam:   General: Awake. Alert. NAD. Sitting up and conversive w/ examiner and support person   Lungs: Unlabored breathing. No respiratory distress   Abd: Soft, non-tender, non-distended. No pain w/ jostling of stretcher    Ext: No calf tenderness bilaterally    LABS:                              12.0   6.85  )-----------( 401      ( 14 Aug 2023 10:02 )             38.6     08-14    139  |  103  |  8   ----------------------------<  82  3.8   |  25  |  0.83    Ca    9.5      14 Aug 2023 10:02      I&O's Detail      Urinalysis Basic - ( 14 Aug 2023 10:02 )    Color: x / Appearance: x / SG: x / pH: x  Gluc: 82 mg/dL / Ketone: x  / Bili: x / Urobili: x   Blood: x / Protein: x / Nitrite: x   Leuk Esterase: x / RBC: x / WBC x   Sq Epi: x / Non Sq Epi: x / Bacteria: x        RADIOLOGY & ADDITIONAL STUDIES:    < from: US Transvaginal, OB (08.15.23 @ 16:00) >  ACC: [id number] EXAM:  US 3D RENDERING W WORKSTATION   ORDERED BY: PJ ESTRADA     ACC: [id number] EXAM:  US OB TRANSVAGINAL   ORDERED BY: PJ ESTRADA     PROCEDURE DATE:  08/15/2023          INTERPRETATION:  CLINICAL INFORMATION: Knownfetal demise. Concern for   cornual or interstitial ectopic pregnancy.    LMP: 2023    Estimated Gestational Age by LMP: 14 weeks and 3 days    COMPARISON: Pelvic ultrasound 2023.    Endovaginal and transabdominal pelvic sonogram. 3-D sonography was also   performed.    FINDINGS:  Uterus: Retroverted. Measures 9.2 x 6.2 x 6.8 cm. A single intrauterine   gestation is again identified, as detailed below.    Gestational Sac Size (mean): 4.4 cm  Gestational Sac Shape: Rounded with mildly irregular margin  Crown Rump Length: 2.46 cm  Estimated Gestational Age: 9 weeks 1 day by crown rump length.  Yolk Sac: Not visualized.  Fetal Heart Rate: None detected.    The gestation appears to be in a high left lateral location with markedly   thinned overlying myometrium, raising suspicion for an interstitial   ectopic pregnancy. Question division of the endometrium near the fundus,   raising the possibility for a uterine anomaly such as septate uterus,   though this is thought to be unlikely.    Right ovary: 4.2 cm x 1.4 cm x 2.0 cm. Within normal limits.  Left ovary: 2.3 cm x 1.7 cm x 1.6 cm. Within normal limits.    Fluid: Trace free fluid in the pelvis.    IMPRESSION:  Redemonstrated fetal demise. The gestation appears to be in a high left   lateral location with markedly thinned overlying myometrium, suspicious   for an interstitial ectopic pregnancy.    Questionable division of the endometrium near the fundus, raising the   possibility for uterine anomaly such as septate uterus, though this is   thought to be unlikely.    Consider pelvic MRI for further evaluation.      Findings were discussed with Dr. Yunior Armstrong 8/15/2023 5:16 PM by Dr. Coley with read back confirmation.    --- End of Report ---            RADHA BARROSO MD; Attending Radiologist  This document has been electronically signed. Aug 15 2023  5:23PM    < end of copied text >   JEANNE PUENTES  32y  Female [id number]  - Hx obtained via a combination of chart review and per patient     HPI: 31yo  @ 14.4wga (see below regarding clarification) by LMP of 2023 who presents to the ED for further evaluation of a failed medical termination of pregnancy. Patient underwent a medical termination of pregnancy on 2023 at [facility] with subsequent passage of tissue at 9.3wga. Reports passing tissue at home but ultimately did not go to a follow-up visit on . Ultimately followed up on  and was referred to [facility] given concerns of retained POCs. TVUS obtained then showed a IUP w/ EGA of 9.5wga w/ no FH. There is a hand-written note scanned from [facility] that states that a D&C was attempted under ultrasound guidance which was unsuccessful. Currently w/o complaints of bleeding. Intermittent cramping noted.     Patient was initially scheduled for a D&C today but was scanned today by [facility] and there was concern for interstitial/cornual pregnancy and/or placental accreta spectrum     Name of GYN Physician: [facility]     POB: Reports x2 full term pregnancies ( and , both of which were c/s). Prior documentation noted that the first may have been pre-term  PGyn: Above  MedHx: Denies   SurgHx: Above, D&C x2 as above   Meds: None  Allergies: NKDA  Social: Marijuana use       Vital Signs Last 24 Hrs  T(C): 36.8 (15 Aug 2023 13:26), Max: 36.8 (15 Aug 2023 13:26)  T(F): 98.3 (15 Aug 2023 13:26), Max: 98.3 (15 Aug 2023 13:26)  HR: 77 (15 Aug 2023 14:40) (77 - 100)  BP: 104/60 (15 Aug 2023 14:40) (104/60 - 116/54)  BP(mean): --  RR: 15 (15 Aug 2023 14:40) (15 - 18)  SpO2: 99% (15 Aug 2023 14:40) (99% - 99%)    Parameters below as of 15 Aug 2023 14:40  Patient On (Oxygen Delivery Method): room air        Physical Exam:   General: Awake. Alert. NAD. Sitting up and conversive w/ examiner and support person   Lungs: Unlabored breathing. No respiratory distress   Abd: Soft, non-tender, non-distended. No pain w/ jostling of stretcher    Ext: No calf tenderness bilaterally    LABS:                              12.0   6.85  )-----------( 401      ( 14 Aug 2023 10:02 )             38.6     08-14    139  |  103  |  8   ----------------------------<  82  3.8   |  25  |  0.83    Ca    9.5      14 Aug 2023 10:02      I&O's Detail      Urinalysis Basic - ( 14 Aug 2023 10:02 )    Color: x / Appearance: x / SG: x / pH: x  Gluc: 82 mg/dL / Ketone: x  / Bili: x / Urobili: x   Blood: x / Protein: x / Nitrite: x   Leuk Esterase: x / RBC: x / WBC x   Sq Epi: x / Non Sq Epi: x / Bacteria: x        RADIOLOGY & ADDITIONAL STUDIES:    < from: US Transvaginal, OB (08.15.23 @ 16:00) >  ACC: [id number] EXAM:  US 3D RENDERING W WORKSTATION   ORDERED BY: PJ ESTRADA     ACC: [id number] EXAM:  US OB TRANSVAGINAL   ORDERED BY: PJ ESTRADA     PROCEDURE DATE:  08/15/2023          INTERPRETATION:  CLINICAL INFORMATION: Knownfetal demise. Concern for   cornual or interstitial ectopic pregnancy.    LMP: 2023    Estimated Gestational Age by LMP: 14 weeks and 3 days    COMPARISON: Pelvic ultrasound 2023.    Endovaginal and transabdominal pelvic sonogram. 3-D sonography was also   performed.    FINDINGS:  Uterus: Retroverted. Measures 9.2 x 6.2 x 6.8 cm. A single intrauterine   gestation is again identified, as detailed below.    Gestational Sac Size (mean): 4.4 cm  Gestational Sac Shape: Rounded with mildly irregular margin  Crown Rump Length: 2.46 cm  Estimated Gestational Age: 9 weeks 1 day by crown rump length.  Yolk Sac: Not visualized.  Fetal Heart Rate: None detected.    The gestation appears to be in a high left lateral location with markedly   thinned overlying myometrium, raising suspicion for an interstitial   ectopic pregnancy. Question division of the endometrium near the fundus,   raising the possibility for a uterine anomaly such as septate uterus,   though this is thought to be unlikely.    Right ovary: 4.2 cm x 1.4 cm x 2.0 cm. Within normal limits.  Left ovary: 2.3 cm x 1.7 cm x 1.6 cm. Within normal limits.    Fluid: Trace free fluid in the pelvis.    IMPRESSION:  Redemonstrated fetal demise. The gestation appears to be in a high left   lateral location with markedly thinned overlying myometrium, suspicious   for an interstitial ectopic pregnancy.    Questionable division of the endometrium near the fundus, raising the   possibility for uterine anomaly such as septate uterus, though this is   thought to be unlikely.    Consider pelvic MRI for further evaluation.      Findings were discussed with Dr. Yunior Armstrong 8/15/2023 5:16 PM by Dr. Coley with read back confirmation.    --- End of Report ---            RADHA BARROSO MD; Attending Radiologist  This document has been electronically signed. Aug 15 2023  5:23PM    < end of copied text >

## 2023-08-15 NOTE — H&P ADULT - NSHPLABSRESULTS_GEN_ALL_CORE
12.4                  Neurophils% (auto):   86.2   (08-15 @ 15:05):    9.90 )-----------(398          Lymphocytes% (auto):  8.7                                           38.3                   Eosinphils% (auto):   0.1      Manual%: Neutrophils x    ; Lymphocytes x    ; Eosinophils x    ; Bands%: x    ; Blasts x          08-15    137  |  101  |  13  ----------------------------<  65<L>  3.7   |  21<L>  |  0.95    Ca    9.6      15 Aug 2023 15:06    TPro  7.9  /  Alb  4.4  /  TBili  0.7  /  DBili  x   /  AST  17  /  ALT  7<L>  /  AlkPhos  67  08-15    ( 08-15 @ 15:06 )   PT: 13.4 sec;   INR: 1.23 ratio  aPTT: 31.7 sec        RECENT CULTURES:  08-14 @ 12:27 Clean Catch Clean Catch (Midstream)     <10,000 CFU/mL Normal Urogenital Massiel      HCG Quantitative, Serum: 13.1    HCG Quantitative, Serum: 178.5

## 2023-08-15 NOTE — H&P ADULT - ATTENDING COMMENTS
Ms. Shepard is a 33yo  at 14w4d by LMP and 9w3d by CRL here for surgical management of failed medical . As detailed above Ms. Shepard has had sporadic prenatal care and is s/p medical termination of pregnancy at an outside clinic. Patient and her  who is at bedside, state that pregnancy was desired but terminated because they were told it may be dangerous. When questions regarding the risks of that pregnancy, they state that it was because it was too close to her last delivery and not because of concern about ectopic pregnancy    Upon review of the ED notes from [facility] and [facility], patient's care was discussed with her OBGYN (Reinier) and she had been cleared for discharge presuming retained POC/failed MAB. Per patient and notes, D&C was attempted but failed and she had been referred to Dr. Yu. Sonogram today was concerning for ectopic pregnancy with Dr. Luis Smith and patient was sent to the [facility] ED.    3D sonogram reveal likely interstitial pregnancy vs cornual ectopic. Explained to patient that it had been almost two months since medical  with no passage of products. concern for ectopic discussed. Explained risks of untreated ectopic or expectant management. Patient also appears to have limited understanding of her medical care thus far and states that she is concerned about appropriate follow up as she doesn't see the doctor "regularly." I discussed rationale for dx lsc and likely wedge resection, salpingectomy. Patient and  agree with the plan. Lengthy discussion regarding etiologies of ectopic, uterine anomaly, and possible intraop findings. Patient states that she no longer desires future fertility. She understands risks of subfertility, infection, hemorrhage, injury, VTE, laparotomy. Need for 6-9month waiting period and need for repeat  delivery in case she did conceive discussed. As patient is currently stable and symptomatic with essentially no change in the pregnancy, will admit overnight. Will plan for surgery tomorrow to ensure appropriate surgical team for MIS approach. Consents signed. Added on to OR schedule.     JULIANNE Mulligan MD Ms. Shepard is a 31yo  at 14w4d by LMP and 9w3d by CRL here for surgical management of failed medical . As detailed above Ms. Shepard has had sporadic prenatal care and is s/p medical termination of pregnancy at an outside clinic. Patient and her  who is at bedside, state that pregnancy was desired but terminated because they were told it may be dangerous. When questions regarding the risks of that pregnancy, they state that it was because it was too close to her last delivery and not because of concern about ectopic pregnancy    Upon review of the ED notes from [facility] and [facility], patient's care was discussed with her OBGYN (Reinier) and she had been cleared for discharge presuming retained POC/failed MAB. Per patient and notes, D&C was attempted but failed and she had been referred to Dr. Yu. Sonogram today was concerning for ectopic pregnancy with Dr. Luis Smith and patient was sent to the [facility] ED.    3D sonogram reveal likely interstitial pregnancy vs cornual ectopic. Explained to patient that it had been almost two months since medical  with no passage of products. concern for ectopic discussed. Explained risks of untreated ectopic or expectant management. Patient also appears to have limited understanding of her medical care thus far and states that she is concerned about appropriate follow up as she doesn't see the doctor "regularly." I discussed rationale for dx lsc and likely wedge resection, salpingectomy. Patient and  agree with the plan. Lengthy discussion regarding etiologies of ectopic, uterine anomaly, and possible intraop findings. Patient states that she no longer desires future fertility. She understands risks of subfertility, infection, hemorrhage, injury, VTE, laparotomy. Need for 6-9month waiting period and need for repeat  delivery in case she did conceive discussed. As patient is currently stable and symptomatic with essentially no change in the pregnancy, will admit overnight. Will plan for surgery tomorrow to ensure appropriate surgical team for MIS approach. Consents signed. Added on to OR schedule.     JULIANNE Mulligan MD Ms. Shepard is a 31yo  at 14w4d by LMP and 9w3d by CRL here for surgical management of failed medical . As detailed above Ms. Shepard has had sporadic prenatal care and is s/p medical termination of pregnancy at an outside clinic. Patient and her  who is at bedside, state that pregnancy was desired but terminated because they were told it may be dangerous. When questions regarding the risks of that pregnancy, they state that it was because it was too close to her last delivery and not because of concern about ectopic pregnancy    Upon review of the ED notes from [facility] beds at the [facility] and [facility], patient's care was discussed with her OBGYN (Reinier) and she had been cleared for discharge presuming retained POC/failed MAB. Per patient and notes, D&C was attempted but failed and she had been referred to Dr. Yu. Sonogram today was concerning for ectopic pregnancy with Dr. Luis Smith and patient was sent to the [facility] ED.    3D sonogram reveal likely interstitial pregnancy vs cornual ectopic. Explained to patient that it had been almost two months since medical  with no passage of products. concern for ectopic discussed. Explained risks of untreated ectopic or expectant management. Patient also appears to have limited understanding of her medical care thus far and states that she is concerned about appropriate follow up as she doesn't see the doctor "regularly." I discussed rationale for dx lsc and likely wedge resection, salpingectomy. Patient and  agree with the plan. Lengthy discussion regarding etiologies of ectopic, uterine anomaly, and possible intraop findings. Patient states that she no longer desires future fertility. She understands risks of subfertility, infection, hemorrhage, injury, VTE, laparotomy. Need for 6-9month waiting period and need for repeat  delivery in case she did conceive discussed. As patient is currently stable and symptomatic with essentially no change in the pregnancy, will admit overnight. Will plan for surgery tomorrow to ensure appropriate surgical team for MIS approach. Consents signed. Added on to OR schedule.     JULIANNE Mulligan MD

## 2023-08-15 NOTE — ED CLERICAL - DIVISION
[facility]... [facility]... [facility]...

## 2023-08-15 NOTE — ED ADULT NURSE NOTE - OBJECTIVE STATEMENT
31 yo female with pmh  presents to ED for further evaluation of failed medication termination of pregnancy. Pt reports medical termination of pregnancy on 2023, reports she passed tissue at home but did not go for f/u appointment. Pt f/u on   showed concerns for retained products. Pt denies current vaginal bleeding/discharge, abdominal pain/cramping, fevers/chills, cp, sob. Pt a&ox3, breathing spontaneous and unlabored, moving all extremities and following commands, skin warm dry and appropriate color. Pt safety measures in place and comfort provided. 33 yo female with pmh  presents to ED for further evaluation of failed medication termination of pregnancy. Pt reports medical termination of pregnancy on 2023, reports she passed tissue at home but did not go for f/u appointment. Pt f/u on   showed concerns for retained products. Pt denies current vaginal bleeding/discharge, abdominal pain/cramping, fevers/chills, cp, sob. Pt a&ox3, breathing spontaneous and unlabored, moving all extremities and following commands, skin warm dry and appropriate color. Pt safety measures in place and comfort provided. 31 yo female with pmh  presents to ED for further evaluation of failed medication termination of pregnancy. Pt reports medical termination of pregnancy on 2023, reports she passed tissue at home but did not go for f/u appointment. Pt f/u on  and US showed concerns for retained products, pt had another round of medical treatment which was not successful and was advised to go to hospital for further evaluation. Pt denies current vaginal bleeding/discharge, abdominal pain/cramping, fevers/chills, cp, sob. Pt a&ox3, breathing spontaneous and unlabored, moving all extremities and following commands, skin warm dry and appropriate color. Pt safety measures in place and comfort provided. 33 yo female with pmh  presents to ED for further evaluation of failed medication termination of pregnancy. Pt reports medical termination of pregnancy on 2023, reports she passed tissue at home but did not go for f/u appointment. Pt f/u on  and US showed concerns for retained products, pt had another round of medical treatment which was not successful and was advised to go to hospital for further evaluation. Pt denies current vaginal bleeding/discharge, abdominal pain/cramping, fevers/chills, cp, sob. Pt a&ox3, breathing spontaneous and unlabored, moving all extremities and following commands, skin warm dry and appropriate color. Pt safety measures in place and comfort provided.

## 2023-08-15 NOTE — ED ADULT TRIAGE NOTE - ARRIVAL FROM
[facility] facility for TVUS/Doctor's office [facility] facility for TVUS/Doctor's office [facility] facility for TVUS/Doctor's office

## 2023-08-15 NOTE — ED ADULT NURSE NOTE - PRO INTERPRETER NEED 2
Freida Armas was seen and treated in our emergency department on 7/11/2021  Diagnosis:      [facility]  may return to work on return date  He may return on this date: 07/13/2021          If you have any questions or concerns, please don't hesitate to call        [facility]    ______________________________           _______________          _______________  Hospital Representative                              Date                                Time English

## 2023-08-15 NOTE — ED ADULT NURSE REASSESSMENT NOTE - NS ED NURSE REASSESS COMMENT FT1
Report received from LUKE Rose. Pt a & o x 4, able to follow commands. Breathing spontaneous & nonlabored. Abdomen soft & nondistended. IV site patent, no signs of phlebitis, flushing without difficulty. Call bell within reach, bed in lowest position.

## 2023-08-15 NOTE — H&P ADULT - ASSESSMENT
33yo  @ 14.4wga by LMP who presents for further evaluation and management of a failed medical termination of pregnancy @ 9.3wga on 2023. Patient was evaluated by MFM earlier today and sent to the ED given concerns for interstitial pregnancy. Patient overall asymptomatic without any complaints and is hemodynamically stable with a reassuring exam. TVUS including 3D rendering demonstrated a high left lateral interstitial ectopic pregnancy. Given findings, will admit for definitive surgical management in the AM including laparoscopic wedge resection    Neuro: Acetaminophen and Ibuprofen PRN  CV: Hemodynamically stable w/ CBC in the AM   Pulm: Saturating well on RA. No acute complaints  GI: Regular diet w/ NPO at midnight   : Voiding Spontaneously  Heme: SCDs, ambulating   FEN: LR@75 at midnight  ID: Afebrile  Endo: No active issues   Dispo: Admit for definitive surgical management in AM     Yunior Armstrong, PGY-2  Ob/Gyn    Seen and evaluated w/ Dr. JULIANNE Mulligan            31yo  @ 14.4wga by LMP who presents for further evaluation and management of a failed medical termination of pregnancy @ 9.3wga on 2023. Patient was evaluated by MFM earlier today and sent to the ED given concerns for interstitial pregnancy. Patient overall asymptomatic without any complaints and is hemodynamically stable with a reassuring exam. TVUS including 3D rendering demonstrated a high left lateral interstitial ectopic pregnancy. Given findings, will admit for definitive surgical management in the AM including laparoscopic wedge resection    Neuro: Acetaminophen and Ibuprofen PRN  CV: Hemodynamically stable w/ CBC in the AM   Pulm: Saturating well on RA. No acute complaints  GI: Regular diet w/ NPO at midnight   : Voiding Spontaneously  Heme: SCDs, ambulating   FEN: LR@75 at midnight  ID: Afebrile  Endo: No active issues   Dispo: Admit for definitive surgical management in AM     Yunior Armstrong, PGY-2  Ob/Gyn    Seen and evaluated w/ Dr. JULIANNE Mulligan            31yo  @ 14.4wga by LMP who presents for further evaluation and management of a failed medical termination of pregnancy @ 9.3wga on 2023. Patient was evaluated by MFM earlier today and sent to the ED given concerns for interstitial pregnancy. Patient overall asymptomatic without any complaints and is hemodynamically stable with a reassuring exam. TVUS including 3D rendering demonstrated a high left lateral interstitial ectopic pregnancy vs cornual ectopic. Given findings, will admit for definitive surgical management in the AM including laparoscopic wedge resection    Neuro: Acetaminophen and Ibuprofen PRN  CV: Hemodynamically stable w/ CBC in the AM   Pulm: Saturating well on RA. No acute complaints  GI: Regular diet w/ NPO at midnight   : Voiding Spontaneously  Heme: SCDs, ambulating   FEN: LR@75 at midnight  ID: Afebrile  Endo: No active issues   Dispo: Admit for definitive surgical management in AM     Yunior Armstrong, PGY-2  Ob/Gyn    Seen and evaluated w/ Dr. JULIANNE Mulligan            33yo  @ 14.4wga by LMP who presents for further evaluation and management of a failed medical termination of pregnancy @ 9.3wga on 2023. Patient was evaluated by MFM earlier today and sent to the ED given concerns for interstitial pregnancy. Patient overall asymptomatic without any complaints and is hemodynamically stable with a reassuring exam. TVUS including 3D rendering demonstrated a high left lateral interstitial ectopic pregnancy vs cornual ectopic. Given findings, will admit for definitive surgical management in the AM including laparoscopic wedge resection    Neuro: Acetaminophen and Ibuprofen PRN  CV: Hemodynamically stable w/ CBC in the AM   Pulm: Saturating well on RA. No acute complaints  GI: Regular diet w/ NPO at midnight   : Voiding Spontaneously  Heme: SCDs, ambulating   FEN: LR@75 at midnight  ID: Afebrile  Endo: No active issues   Dispo: Admit for definitive surgical management in AM     Yunior Armstrong, PGY-2  Ob/Gyn    Seen and evaluated w/ Dr. JULIANNE Mulligan

## 2023-08-15 NOTE — CONSULT NOTE ADULT - ASSESSMENT
31yo  @ 14.4wga by LMP who presents for further evaluation and management of a failed medical termination of pregnancy @ 9.3wga on 2023. Patient was evaluated by MFM earlier today and sent to the ED given concerns for interstitial pregnancy. Patient overall asymptomatic without any complaints and is hemodynamically stable with a reassuring exam. TVUS including 3D rendering demonstrated a high left lateral interstitial ectopic pregnancy.     Recommendations:  -   33yo  @ 14.4wga by LMP who presents for further evaluation and management of a failed medical termination of pregnancy @ 9.3wga on 2023. Patient was evaluated by MFM earlier today and sent to the ED given concerns for interstitial pregnancy. Patient overall asymptomatic without any complaints and is hemodynamically stable with a reassuring exam. TVUS including 3D rendering demonstrated a high left lateral interstitial ectopic pregnancy.     Recommendations:  -

## 2023-08-15 NOTE — H&P ADULT - HISTORY OF PRESENT ILLNESS
JEANNE PUENTES  32y  Female [id number]  - Hx obtained via a combination of chart review and per patient     HPI: 31yo  @ 14.4wga (see below regarding clarification) by LMP of 2023 who presents to the ED for further evaluation of a failed medical termination of pregnancy. Patient underwent a medical termination of pregnancy on 2023 at [facility] with subsequent passage of tissue at 9.3wga. Reports passing tissue at home but ultimately did not go to a follow-up visit on . Ultimately followed up on  and was referred to [facility] given concerns of retained POCs. TVUS obtained then showed a IUP w/ EGA of 9.5wga w/ no FH. There is a hand-written note scanned from [facility] that states that a D&C was attempted under ultrasound guidance which was unsuccessful. Currently w/o complaints of bleeding. Intermittent cramping noted.     Patient was initially scheduled for a D&C today but was scanned today by [facility] and there was concern for interstitial/cornual pregnancy and/or placental accreta spectrum     Name of GYN Physician: [facility]     POB: Reports x2 full term pregnancies ( and , both of which were c/s). Prior documentation noted that the first may have been pre-term  PGyn: Above  MedHx: Denies   SurgHx: Above, D&C x2 as above   Meds: None  Allergies: NKDA  Social: Marijuana use       Vital Signs Last 24 Hrs  T(C): 36.8 (15 Aug 2023 13:26), Max: 36.8 (15 Aug 2023 13:26)  T(F): 98.3 (15 Aug 2023 13:26), Max: 98.3 (15 Aug 2023 13:26)  HR: 77 (15 Aug 2023 14:40) (77 - 100)  BP: 104/60 (15 Aug 2023 14:40) (104/60 - 116/54)  BP(mean): --  RR: 15 (15 Aug 2023 14:40) (15 - 18)  SpO2: 99% (15 Aug 2023 14:40) (99% - 99%)    Parameters below as of 15 Aug 2023 14:40  Patient On (Oxygen Delivery Method): room air        Physical Exam:   General: Awake. Alert. NAD. Sitting up and conversive w/ examiner and support person   Lungs: Unlabored breathing. No respiratory distress   Abd: Soft, non-tender, non-distended. No pain w/ jostling of stretcher    Ext: No calf tenderness bilaterally    LABS:                              12.0   6.85  )-----------( 401      ( 14 Aug 2023 10:02 )             38.6     08-14    139  |  103  |  8   ----------------------------<  82  3.8   |  25  |  0.83    Ca    9.5      14 Aug 2023 10:02      I&O's Detail      Urinalysis Basic - ( 14 Aug 2023 10:02 )    Color: x / Appearance: x / SG: x / pH: x  Gluc: 82 mg/dL / Ketone: x  / Bili: x / Urobili: x   Blood: x / Protein: x / Nitrite: x   Leuk Esterase: x / RBC: x / WBC x   Sq Epi: x / Non Sq Epi: x / Bacteria: x        RADIOLOGY & ADDITIONAL STUDIES:    < from: US Transvaginal, OB (08.15.23 @ 16:00) >  ACC: [id number] EXAM:  US 3D RENDERING W WORKSTATION   ORDERED BY: PJ ESTRADA     ACC: [id number] EXAM:  US OB TRANSVAGINAL   ORDERED BY: PJ ESTRADA     PROCEDURE DATE:  08/15/2023          INTERPRETATION:  CLINICAL INFORMATION: Knownfetal demise. Concern for   cornual or interstitial ectopic pregnancy.    LMP: 2023    Estimated Gestational Age by LMP: 14 weeks and 3 days    COMPARISON: Pelvic ultrasound 2023.    Endovaginal and transabdominal pelvic sonogram. 3-D sonography was also   performed.    FINDINGS:  Uterus: Retroverted. Measures 9.2 x 6.2 x 6.8 cm. A single intrauterine   gestation is again identified, as detailed below.    Gestational Sac Size (mean): 4.4 cm  Gestational Sac Shape: Rounded with mildly irregular margin  Crown Rump Length: 2.46 cm  Estimated Gestational Age: 9 weeks 1 day by crown rump length.  Yolk Sac: Not visualized.  Fetal Heart Rate: None detected.    The gestation appears to be in a high left lateral location with markedly   thinned overlying myometrium, raising suspicion for an interstitial   ectopic pregnancy. Question division of the endometrium near the fundus,   raising the possibility for a uterine anomaly such as septate uterus,   though this is thought to be unlikely.    Right ovary: 4.2 cm x 1.4 cm x 2.0 cm. Within normal limits.  Left ovary: 2.3 cm x 1.7 cm x 1.6 cm. Within normal limits.    Fluid: Trace free fluid in the pelvis.    IMPRESSION:  Redemonstrated fetal demise. The gestation appears to be in a high left   lateral location with markedly thinned overlying myometrium, suspicious   for an interstitial ectopic pregnancy.    Questionable division of the endometrium near the fundus, raising the   possibility for uterine anomaly such as septate uterus, though this is   thought to be unlikely.    Consider pelvic MRI for further evaluation.      Findings were discussed with Dr. Yunior Armstrong 8/15/2023 5:16 PM by Dr. Coley with read back confirmation.    --- End of Report ---            RADHA BARROSO MD; Attending Radiologist  This document has been electronically signed. Aug 15 2023  5:23PM    < end of copied text >         JEANNE PUENTES  32y  Female [id number]  - Hx obtained via a combination of chart review and per patient     HPI: 31yo  @ 14.4wga (see below regarding clarification) by LMP of 2023 who presents to the ED for further evaluation of a failed medical termination of pregnancy. Patient underwent a medical termination of pregnancy on 2023 at [facility] with subsequent passage of tissue at 9.3wga. Reports passing tissue at home but ultimately did not go to a follow-up visit on . Ultimately followed up on  and was referred to [facility] given concerns of retained POCs. TVUS obtained then showed a IUP w/ EGA of 9.5wga w/ no FH. There is a hand-written note scanned from [facility] that states that a D&C was attempted under ultrasound guidance which was unsuccessful. Currently w/o complaints of bleeding. Intermittent cramping noted.     Patient was initially scheduled for a D&C today but was scanned today by [facility] and there was concern for interstitial/cornual pregnancy and/or placental accreta spectrum     Name of GYN Physician: [facility]     POB: Reports x2 full term pregnancies ( and , both of which were c/s). Prior documentation noted that the first may have been pre-term  PGyn: Above  MedHx: Denies   SurgHx: Above, D&C x2 as above   Meds: None  Allergies: NKDA  Social: Marijuana use       Vital Signs Last 24 Hrs  T(C): 36.8 (15 Aug 2023 13:26), Max: 36.8 (15 Aug 2023 13:26)  T(F): 98.3 (15 Aug 2023 13:26), Max: 98.3 (15 Aug 2023 13:26)  HR: 77 (15 Aug 2023 14:40) (77 - 100)  BP: 104/60 (15 Aug 2023 14:40) (104/60 - 116/54)  BP(mean): --  RR: 15 (15 Aug 2023 14:40) (15 - 18)  SpO2: 99% (15 Aug 2023 14:40) (99% - 99%)    Parameters below as of 15 Aug 2023 14:40  Patient On (Oxygen Delivery Method): room air        Physical Exam:   General: Awake. Alert. NAD. Sitting up and conversive w/ examiner and support person   Lungs: Unlabored breathing. No respiratory distress   Abd: Soft, non-tender, non-distended. No pain w/ jostling of stretcher    Ext: No calf tenderness bilaterally    LABS:                              12.0   6.85  )-----------( 401      ( 14 Aug 2023 10:02 )             38.6     08-14    139  |  103  |  8   ----------------------------<  82  3.8   |  25  |  0.83    Ca    9.5      14 Aug 2023 10:02      I&O's Detail      Urinalysis Basic - ( 14 Aug 2023 10:02 )    Color: x / Appearance: x / SG: x / pH: x  Gluc: 82 mg/dL / Ketone: x  / Bili: x / Urobili: x   Blood: x / Protein: x / Nitrite: x   Leuk Esterase: x / RBC: x / WBC x   Sq Epi: x / Non Sq Epi: x / Bacteria: x        RADIOLOGY & ADDITIONAL STUDIES:    < from: US Transvaginal, OB (08.15.23 @ 16:00) >  ACC: [id number] EXAM:  US 3D RENDERING W WORKSTATION   ORDERED BY: PJ ESTRADA     ACC: [id number] EXAM:  US OB TRANSVAGINAL   ORDERED BY: PJ ESTRADA     PROCEDURE DATE:  08/15/2023          INTERPRETATION:  CLINICAL INFORMATION: Knownfetal demise. Concern for   cornual or interstitial ectopic pregnancy.    LMP: 2023    Estimated Gestational Age by LMP: 14 weeks and 3 days    COMPARISON: Pelvic ultrasound 2023.    Endovaginal and transabdominal pelvic sonogram. 3-D sonography was also   performed.    FINDINGS:  Uterus: Retroverted. Measures 9.2 x 6.2 x 6.8 cm. A single intrauterine   gestation is again identified, as detailed below.    Gestational Sac Size (mean): 4.4 cm  Gestational Sac Shape: Rounded with mildly irregular margin  Crown Rump Length: 2.46 cm  Estimated Gestational Age: 9 weeks 1 day by crown rump length.  Yolk Sac: Not visualized.  Fetal Heart Rate: None detected.    The gestation appears to be in a high left lateral location with markedly   thinned overlying myometrium, raising suspicion for an interstitial   ectopic pregnancy. Question division of the endometrium near the fundus,   raising the possibility for a uterine anomaly such as septate uterus,   though this is thought to be unlikely.    Right ovary: 4.2 cm x 1.4 cm x 2.0 cm. Within normal limits.  Left ovary: 2.3 cm x 1.7 cm x 1.6 cm. Within normal limits.    Fluid: Trace free fluid in the pelvis.    IMPRESSION:  Redemonstrated fetal demise. The gestation appears to be in a high left   lateral location with markedly thinned overlying myometrium, suspicious   for an interstitial ectopic pregnancy.    Questionable division of the endometrium near the fundus, raising the   possibility for uterine anomaly such as septate uterus, though this is   thought to be unlikely.    Consider pelvic MRI for further evaluation.      Findings were discussed with Dr. Yunior Armstrong 8/15/2023 5:16 PM by Dr. Coley with read back confirmation.    --- End of Report ---            RADHA BARROSO MD; Attending Radiologist  This document has been electronically signed. Aug 15 2023  5:23PM    < end of copied text >         JEANNE PUENTES  32y  Female [id number]  - Hx obtained via a combination of chart review and per patient     HPI: 31yo  @ 14.4wga (see below regarding clarification) by LMP of 2023 who presents to the ED for further evaluation of a failed medical termination of pregnancy. Patient underwent a medical termination of pregnancy on 2023 at [facility] with subsequent passage of tissue at 9.3wga. Reports passing tissue at home but ultimately did not go to a follow-up visit on . Ultimately followed up on  and was referred to [facility] given concerns of retained POCs. TVUS obtained then showed a IUP w/ EGA of 9.5wga w/ no FH. There is a hand-written note scanned from [facility] that states that a D&C was attempted under ultrasound guidance which was unsuccessful. Currently w/o complaints of bleeding. Intermittent cramping noted.     Patient was initially scheduled for a D&C today but was scanned today by [facility] and there was concern for interstitial/cornual pregnancy and/or placental accreta spectrum     Name of GYN Physician: [facility]     POB: Reports x2 full term pregnancies ( and , both of which were c/s). Prior documentation noted that the first may have been pre-term  PGyn: Above  MedHx: Denies   SurgHx: Above, D&C x2 as above   Meds: None  Allergies: NKDA  Social: Marijuana use       Vital Signs Last 24 Hrs  T(C): 36.8 (15 Aug 2023 13:26), Max: 36.8 (15 Aug 2023 13:26)  T(F): 98.3 (15 Aug 2023 13:26), Max: 98.3 (15 Aug 2023 13:26)  HR: 77 (15 Aug 2023 14:40) (77 - 100)  BP: 104/60 (15 Aug 2023 14:40) (104/60 - 116/54)  BP(mean): --  RR: 15 (15 Aug 2023 14:40) (15 - 18)  SpO2: 99% (15 Aug 2023 14:40) (99% - 99%)    Parameters below as of 15 Aug 2023 14:40  Patient On (Oxygen Delivery Method): room air        Physical Exam:   General: Awake. Alert. NAD. Sitting up and conversive w/ examiner and support person   Lungs: Unlabored breathing. No respiratory distress   Abd: Soft, non-tender, non-distended. No pain w/ jostling of stretcher    Ext: No calf tenderness bilaterally    LABS:                              12.0   6.85  )-----------( 401      ( 14 Aug 2023 10:02 )             38.6     08-14    139  |  103  |  8   ----------------------------<  82  3.8   |  25  |  0.83    Ca    9.5      14 Aug 2023 10:02      I&O's Detail      Urinalysis Basic - ( 14 Aug 2023 10:02 )    Color: x / Appearance: x / SG: x / pH: x  Gluc: 82 mg/dL / Ketone: x  / Bili: x / Urobili: x   Blood: x / Protein: x / Nitrite: x   Leuk Esterase: x / RBC: x / WBC x   Sq Epi: x / Non Sq Epi: x / Bacteria: x        RADIOLOGY & ADDITIONAL STUDIES:    < from: US Transvaginal, OB (08.15.23 @ 16:00) >  ACC: [id number] EXAM:  US 3D RENDERING W WORKSTATION   ORDERED BY: PJ ESTRADA     ACC: [id number] EXAM:  US OB TRANSVAGINAL   ORDERED BY: PJ ESTRADA     PROCEDURE DATE:  08/15/2023          INTERPRETATION:  CLINICAL INFORMATION: Knownfetal demise. Concern for   cornual or interstitial ectopic pregnancy.    LMP: 2023    Estimated Gestational Age by LMP: 14 weeks and 3 days    COMPARISON: Pelvic ultrasound 2023.    Endovaginal and transabdominal pelvic sonogram. 3-D sonography was also   performed.    FINDINGS:  Uterus: Retroverted. Measures 9.2 x 6.2 x 6.8 cm. A single intrauterine   gestation is again identified, as detailed below.    Gestational Sac Size (mean): 4.4 cm  Gestational Sac Shape: Rounded with mildly irregular margin  Crown Rump Length: 2.46 cm  Estimated Gestational Age: 9 weeks 1 day by crown rump length.  Yolk Sac: Not visualized.  Fetal Heart Rate: None detected.    The gestation appears to be in a high left lateral location with markedly   thinned overlying myometrium, raising suspicion for an interstitial   ectopic pregnancy. Question division of the endometrium near the fundus,   raising the possibility for a uterine anomaly such as septate uterus,   though this is thought to be unlikely.    Right ovary: 4.2 cm x 1.4 cm x 2.0 cm. Within normal limits.  Left ovary: 2.3 cm x 1.7 cm x 1.6 cm. Within normal limits.    Fluid: Trace free fluid in the pelvis.    IMPRESSION:  Redemonstrated fetal demise. The gestation appears to be in a high left   lateral location with markedly thinned overlying myometrium, suspicious   for an interstitial ectopic pregnancy.    Questionable division of the endometrium near the fundus, raising the   possibility for uterine anomaly such as septate uterus, though this is   thought to be unlikely.    Consider pelvic MRI for further evaluation.      Findings were discussed with Dr. Yunior Armstrong 8/15/2023 5:16 PM by Dr. Coley with read back confirmation.    --- End of Report ---            RADHA BARROSO MD; Attending Radiologist  This document has been electronically signed. Aug 15 2023  5:23PM    < end of copied text >         JEANNE PUENTES  32y  Female [id number]  - Hx obtained via a combination of chart review and per patient     HPI: 31yo  @ 14.4wga (see below regarding clarification) by LMP of 2023 who presents to the ED for further evaluation of a failed medical termination of pregnancy. Patient underwent a medical termination of pregnancy on 2023 at [facility] with subsequent passage of tissue at 9.3wga. Reports passing tissue at home but ultimately did not go to a follow-up visit on . Ultimately followed up on  and was referred to [facility] given concerns of retained POCs. TVUS obtained then showed a IUP w/ EGA of 9.5wga w/ no FH. There is a hand-written note scanned from [facility] that states that a D&C was attempted under ultrasound guidance which was unsuccessful. Currently w/o complaints of bleeding. Intermittent cramping noted.     Patient was initially scheduled for a D&C today but was scanned today by [facility] and there was concern for interstitial/cornual pregnancy and/or placental accreta spectrum     Name of GYN Physician: [facility]     POB: Reports x2 full term pregnancies ( and , both of which were c/s. First for breech). Prior documentation noted that the first may have been pre-term  PGyn: Above, IUD removal (describes multiple attempts to remove it including potential issues with being embedded and/or broken)   MedHx: Denies   SurgHx: Above, D&C x2 as above   Meds: None  Allergies: NKDA  Social: Marijuana use       Vital Signs Last 24 Hrs  T(C): 36.8 (15 Aug 2023 13:26), Max: 36.8 (15 Aug 2023 13:26)  T(F): 98.3 (15 Aug 2023 13:26), Max: 98.3 (15 Aug 2023 13:26)  HR: 77 (15 Aug 2023 14:40) (77 - 100)  BP: 104/60 (15 Aug 2023 14:40) (104/60 - 116/54)  BP(mean): --  RR: 15 (15 Aug 2023 14:40) (15 - 18)  SpO2: 99% (15 Aug 2023 14:40) (99% - 99%)    Parameters below as of 15 Aug 2023 14:40  Patient On (Oxygen Delivery Method): room air        Physical Exam:   General: Awake. Alert. NAD. Sitting up and conversive w/ examiner and support person   Lungs: Unlabored breathing. No respiratory distress   Abd: Soft, non-tender, non-distended. No pain w/ jostling of stretcher    Ext: No calf tenderness bilaterally    LABS:                              12.0   6.85  )-----------( 401      ( 14 Aug 2023 10:02 )             38.6     08-    139  |  103  |  8   ----------------------------<  82  3.8   |  25  |  0.83    Ca    9.5      14 Aug 2023 10:02      I&O's Detail      Urinalysis Basic - ( 14 Aug 2023 10:02 )    Color: x / Appearance: x / SG: x / pH: x  Gluc: 82 mg/dL / Ketone: x  / Bili: x / Urobili: x   Blood: x / Protein: x / Nitrite: x   Leuk Esterase: x / RBC: x / WBC x   Sq Epi: x / Non Sq Epi: x / Bacteria: x        RADIOLOGY & ADDITIONAL STUDIES:    < from: US Transvaginal, OB (08.15.23 @ 16:00) >  ACC: [id number] EXAM:  US 3D RENDERING W WORKSTATION   ORDERED BY: PJ ESTRADA     ACC: [id number] EXAM:  US OB TRANSVAGINAL   ORDERED BY: PJ ESTRADA     PROCEDURE DATE:  08/15/2023          INTERPRETATION:  CLINICAL INFORMATION: Knownfetal demise. Concern for   cornual or interstitial ectopic pregnancy.    LMP: 2023    Estimated Gestational Age by LMP: 14 weeks and 3 days    COMPARISON: Pelvic ultrasound 2023.    Endovaginal and transabdominal pelvic sonogram. 3-D sonography was also   performed.    FINDINGS:  Uterus: Retroverted. Measures 9.2 x 6.2 x 6.8 cm. A single intrauterine   gestation is again identified, as detailed below.    Gestational Sac Size (mean): 4.4 cm  Gestational Sac Shape: Rounded with mildly irregular margin  Crown Rump Length: 2.46 cm  Estimated Gestational Age: 9 weeks 1 day by crown rump length.  Yolk Sac: Not visualized.  Fetal Heart Rate: None detected.    The gestation appears to be in a high left lateral location with markedly   thinned overlying myometrium, raising suspicion for an interstitial   ectopic pregnancy. Question division of the endometrium near the fundus,   raising the possibility for a uterine anomaly such as septate uterus,   though this is thought to be unlikely.    Right ovary: 4.2 cm x 1.4 cm x 2.0 cm. Within normal limits.  Left ovary: 2.3 cm x 1.7 cm x 1.6 cm. Within normal limits.    Fluid: Trace free fluid in the pelvis.    IMPRESSION:  Redemonstrated fetal demise. The gestation appears to be in a high left   lateral location with markedly thinned overlying myometrium, suspicious   for an interstitial ectopic pregnancy.    Questionable division of the endometrium near the fundus, raising the   possibility for uterine anomaly such as septate uterus, though this is   thought to be unlikely.    Consider pelvic MRI for further evaluation.      Findings were discussed with Dr. Yunior Armstrong 8/15/2023 5:16 PM by Dr. Coley with read back confirmation.    --- End of Report ---            RADHA BARROSO MD; Attending Radiologist  This document has been electronically signed. Aug 15 2023  5:23PM    < end of copied text >         JEANNE PUENTES  32y  Female [id number]  - Hx obtained via a combination of chart review and per patient     HPI: 33yo  @ 14.4wga (see below regarding clarification) by LMP of 2023 who presents to the ED for further evaluation of a failed medical termination of pregnancy. Patient underwent a medical termination of pregnancy on 2023 at [facility] with subsequent passage of tissue at 9.3wga. Reports passing tissue at home but ultimately did not go to a follow-up visit on . Ultimately followed up on  and was referred to [facility] given concerns of retained POCs. TVUS obtained then showed a IUP w/ EGA of 9.5wga w/ no FH. There is a hand-written note scanned from [facility] that states that a D&C was attempted under ultrasound guidance which was unsuccessful. Currently w/o complaints of bleeding. Intermittent cramping noted.     Patient was initially scheduled for a D&C today but was scanned today by [facility] and there was concern for interstitial/cornual pregnancy and/or placental accreta spectrum     Name of GYN Physician: [facility]     POB: Reports x2 full term pregnancies ( and , both of which were c/s. First for breech). Prior documentation noted that the first may have been pre-term  PGyn: Above, IUD removal (describes multiple attempts to remove it including potential issues with being embedded and/or broken)   MedHx: Denies   SurgHx: Above, D&C x2 as above   Meds: None  Allergies: NKDA  Social: Marijuana use       Vital Signs Last 24 Hrs  T(C): 36.8 (15 Aug 2023 13:26), Max: 36.8 (15 Aug 2023 13:26)  T(F): 98.3 (15 Aug 2023 13:26), Max: 98.3 (15 Aug 2023 13:26)  HR: 77 (15 Aug 2023 14:40) (77 - 100)  BP: 104/60 (15 Aug 2023 14:40) (104/60 - 116/54)  BP(mean): --  RR: 15 (15 Aug 2023 14:40) (15 - 18)  SpO2: 99% (15 Aug 2023 14:40) (99% - 99%)    Parameters below as of 15 Aug 2023 14:40  Patient On (Oxygen Delivery Method): room air        Physical Exam:   General: Awake. Alert. NAD. Sitting up and conversive w/ examiner and support person   Lungs: Unlabored breathing. No respiratory distress   Abd: Soft, non-tender, non-distended. No pain w/ jostling of stretcher    Ext: No calf tenderness bilaterally    LABS:                              12.0   6.85  )-----------( 401      ( 14 Aug 2023 10:02 )             38.6     08-    139  |  103  |  8   ----------------------------<  82  3.8   |  25  |  0.83    Ca    9.5      14 Aug 2023 10:02      I&O's Detail      Urinalysis Basic - ( 14 Aug 2023 10:02 )    Color: x / Appearance: x / SG: x / pH: x  Gluc: 82 mg/dL / Ketone: x  / Bili: x / Urobili: x   Blood: x / Protein: x / Nitrite: x   Leuk Esterase: x / RBC: x / WBC x   Sq Epi: x / Non Sq Epi: x / Bacteria: x        RADIOLOGY & ADDITIONAL STUDIES:    < from: US Transvaginal, OB (08.15.23 @ 16:00) >  ACC: [id number] EXAM:  US 3D RENDERING W WORKSTATION   ORDERED BY: PJ ESTRADA     ACC: [id number] EXAM:  US OB TRANSVAGINAL   ORDERED BY: PJ ESTRADA     PROCEDURE DATE:  08/15/2023          INTERPRETATION:  CLINICAL INFORMATION: Knownfetal demise. Concern for   cornual or interstitial ectopic pregnancy.    LMP: 2023    Estimated Gestational Age by LMP: 14 weeks and 3 days    COMPARISON: Pelvic ultrasound 2023.    Endovaginal and transabdominal pelvic sonogram. 3-D sonography was also   performed.    FINDINGS:  Uterus: Retroverted. Measures 9.2 x 6.2 x 6.8 cm. A single intrauterine   gestation is again identified, as detailed below.    Gestational Sac Size (mean): 4.4 cm  Gestational Sac Shape: Rounded with mildly irregular margin  Crown Rump Length: 2.46 cm  Estimated Gestational Age: 9 weeks 1 day by crown rump length.  Yolk Sac: Not visualized.  Fetal Heart Rate: None detected.    The gestation appears to be in a high left lateral location with markedly   thinned overlying myometrium, raising suspicion for an interstitial   ectopic pregnancy. Question division of the endometrium near the fundus,   raising the possibility for a uterine anomaly such as septate uterus,   though this is thought to be unlikely.    Right ovary: 4.2 cm x 1.4 cm x 2.0 cm. Within normal limits.  Left ovary: 2.3 cm x 1.7 cm x 1.6 cm. Within normal limits.    Fluid: Trace free fluid in the pelvis.    IMPRESSION:  Redemonstrated fetal demise. The gestation appears to be in a high left   lateral location with markedly thinned overlying myometrium, suspicious   for an interstitial ectopic pregnancy.    Questionable division of the endometrium near the fundus, raising the   possibility for uterine anomaly such as septate uterus, though this is   thought to be unlikely.    Consider pelvic MRI for further evaluation.      Findings were discussed with Dr. Yunior Armstrong 8/15/2023 5:16 PM by Dr. Coley with read back confirmation.    --- End of Report ---            RADHA BARROSO MD; Attending Radiologist  This document has been electronically signed. Aug 15 2023  5:23PM    < end of copied text >         JEANNE PUENTES  32y  Female [id number]  - Hx obtained via a combination of chart review and per patient     HPI: 31yo  @ 14.4wga (see below regarding clarification) by LMP of 2023 who presents to the ED for further evaluation of a failed medical termination of pregnancy. Patient underwent a medical termination of pregnancy on 2023 at [facility] with subsequent passage of tissue at 9.3wga. Reports passing tissue at home but ultimately did not go to a follow-up visit on . Ultimately followed up on  and was referred to [facility] given concerns of retained POCs. TVUS obtained then showed a IUP w/ EGA of 9.5wga w/ no FH. There is a hand-written note scanned from [facility] that states that a D&C was attempted under ultrasound guidance which was unsuccessful. Currently w/o complaints of bleeding. Intermittent cramping noted.     Patient was initially scheduled for a D&C today but was scanned today by [facility] and there was concern for interstitial/cornual pregnancy and/or placental accreta spectrum     Name of GYN Physician: [facility]     POB: Reports x2 full term pregnancies ( and , both of which were c/s. First for breech). Prior documentation noted that the first may have been pre-term  PGyn: Above, IUD removal (describes multiple attempts to remove it including potential issues with being embedded and/or broken)   MedHx: Denies   SurgHx: Above, D&C x2 as above   Meds: None  Allergies: NKDA  Social: Marijuana use       Vital Signs Last 24 Hrs  T(C): 36.8 (15 Aug 2023 13:26), Max: 36.8 (15 Aug 2023 13:26)  T(F): 98.3 (15 Aug 2023 13:26), Max: 98.3 (15 Aug 2023 13:26)  HR: 77 (15 Aug 2023 14:40) (77 - 100)  BP: 104/60 (15 Aug 2023 14:40) (104/60 - 116/54)  BP(mean): --  RR: 15 (15 Aug 2023 14:40) (15 - 18)  SpO2: 99% (15 Aug 2023 14:40) (99% - 99%)    Parameters below as of 15 Aug 2023 14:40  Patient On (Oxygen Delivery Method): room air        Physical Exam:   General: Awake. Alert. NAD. Sitting up and conversive w/ examiner and support person   Lungs: Unlabored breathing. No respiratory distress   Abd: Soft, non-tender, non-distended. No pain w/ jostling of stretcher    Ext: No calf tenderness bilaterally    LABS:                              12.0   6.85  )-----------( 401      ( 14 Aug 2023 10:02 )             38.6     08-    139  |  103  |  8   ----------------------------<  82  3.8   |  25  |  0.83    Ca    9.5      14 Aug 2023 10:02      I&O's Detail      Urinalysis Basic - ( 14 Aug 2023 10:02 )    Color: x / Appearance: x / SG: x / pH: x  Gluc: 82 mg/dL / Ketone: x  / Bili: x / Urobili: x   Blood: x / Protein: x / Nitrite: x   Leuk Esterase: x / RBC: x / WBC x   Sq Epi: x / Non Sq Epi: x / Bacteria: x        RADIOLOGY & ADDITIONAL STUDIES:    < from: US Transvaginal, OB (08.15.23 @ 16:00) >  ACC: [id number] EXAM:  US 3D RENDERING W WORKSTATION   ORDERED BY: PJ ESTRADA     ACC: [id number] EXAM:  US OB TRANSVAGINAL   ORDERED BY: PJ ESTRADA     PROCEDURE DATE:  08/15/2023          INTERPRETATION:  CLINICAL INFORMATION: Knownfetal demise. Concern for   cornual or interstitial ectopic pregnancy.    LMP: 2023    Estimated Gestational Age by LMP: 14 weeks and 3 days    COMPARISON: Pelvic ultrasound 2023.    Endovaginal and transabdominal pelvic sonogram. 3-D sonography was also   performed.    FINDINGS:  Uterus: Retroverted. Measures 9.2 x 6.2 x 6.8 cm. A single intrauterine   gestation is again identified, as detailed below.    Gestational Sac Size (mean): 4.4 cm  Gestational Sac Shape: Rounded with mildly irregular margin  Crown Rump Length: 2.46 cm  Estimated Gestational Age: 9 weeks 1 day by crown rump length.  Yolk Sac: Not visualized.  Fetal Heart Rate: None detected.    The gestation appears to be in a high left lateral location with markedly   thinned overlying myometrium, raising suspicion for an interstitial   ectopic pregnancy. Question division of the endometrium near the fundus,   raising the possibility for a uterine anomaly such as septate uterus,   though this is thought to be unlikely.    Right ovary: 4.2 cm x 1.4 cm x 2.0 cm. Within normal limits.  Left ovary: 2.3 cm x 1.7 cm x 1.6 cm. Within normal limits.    Fluid: Trace free fluid in the pelvis.    IMPRESSION:  Redemonstrated fetal demise. The gestation appears to be in a high left   lateral location with markedly thinned overlying myometrium, suspicious   for an interstitial ectopic pregnancy.    Questionable division of the endometrium near the fundus, raising the   possibility for uterine anomaly such as septate uterus, though this is   thought to be unlikely.    Consider pelvic MRI for further evaluation.      Findings were discussed with Dr. Yunior Armstrong 8/15/2023 5:16 PM by Dr. Coley with read back confirmation.    --- End of Report ---            RADHA BARROSO MD; Attending Radiologist  This document has been electronically signed. Aug 15 2023  5:23PM    < end of copied text >         JEANNE PUENTES  32y  Female [id number]  - Hx obtained via a combination of chart review and per patient     HPI: 31yo  @ 14.4wga (see below regarding clarification) by LMP of 2023 who presents to the ED for further evaluation of a failed medical termination of pregnancy. Patient underwent a medical termination of pregnancy on 2023 at [facility] with subsequent passage of tissue at 9.3wga. Reports passing tissue at home but ultimately did not go to a follow-up visit on . Ultimately followed up on  and was referred to [facility] given concerns of retained POCs. TVUS obtained then showed an IUP w/ EGA of 9.5wga w/ no FH. There is a hand-written note scanned from [facility] that states that a D&C was attempted under ultrasound guidance which was unsuccessful. Currently w/o complaints of bleeding. Intermittent cramping noted.     Patient was initially scheduled for a D&C today but was scanned today by [facility] and there was concern for interstitial/cornual pregnancy and/or placental accreta spectrum     Name of GYN Physician: [facility]     POB: Reports x2 full term pregnancies ( and , both of which were c/s. First for breech). Prior documentation noted that the first may have been pre-term  PGyn: Above, IUD removal (describes multiple attempts to remove it including potential issues with being embedded and/or broken)   MedHx: Denies   SurgHx: Above, D&C x2 as above   Meds: None  Allergies: NKDA  Social: Marijuana use       Vital Signs Last 24 Hrs  T(C): 36.8 (15 Aug 2023 13:26), Max: 36.8 (15 Aug 2023 13:26)  T(F): 98.3 (15 Aug 2023 13:26), Max: 98.3 (15 Aug 2023 13:26)  HR: 77 (15 Aug 2023 14:40) (77 - 100)  BP: 104/60 (15 Aug 2023 14:40) (104/60 - 116/54)  BP(mean): --  RR: 15 (15 Aug 2023 14:40) (15 - 18)  SpO2: 99% (15 Aug 2023 14:40) (99% - 99%)    Parameters below as of 15 Aug 2023 14:40  Patient On (Oxygen Delivery Method): room air        Physical Exam:   General: Awake. Alert. NAD. Sitting up and conversive w/ examiner and support person   Lungs: Unlabored breathing. No respiratory distress   Abd: Soft, non-tender, non-distended. No pain w/ jostling of stretcher    Ext: No calf tenderness bilaterally    LABS:                              12.0   6.85  )-----------( 401      ( 14 Aug 2023 10:02 )             38.6     08-    139  |  103  |  8   ----------------------------<  82  3.8   |  25  |  0.83    Ca    9.5      14 Aug 2023 10:02      I&O's Detail      Urinalysis Basic - ( 14 Aug 2023 10:02 )    Color: x / Appearance: x / SG: x / pH: x  Gluc: 82 mg/dL / Ketone: x  / Bili: x / Urobili: x   Blood: x / Protein: x / Nitrite: x   Leuk Esterase: x / RBC: x / WBC x   Sq Epi: x / Non Sq Epi: x / Bacteria: x        RADIOLOGY & ADDITIONAL STUDIES:    < from: US Transvaginal, OB (08.15.23 @ 16:00) >  ACC: [id number] EXAM:  US 3D RENDERING W WORKSTATION   ORDERED BY: PJ ESTRADA     ACC: [id number] EXAM:  US OB TRANSVAGINAL   ORDERED BY: PJ ESTRADA     PROCEDURE DATE:  08/15/2023          INTERPRETATION:  CLINICAL INFORMATION: Knownfetal demise. Concern for   cornual or interstitial ectopic pregnancy.    LMP: 2023    Estimated Gestational Age by LMP: 14 weeks and 3 days    COMPARISON: Pelvic ultrasound 2023.    Endovaginal and transabdominal pelvic sonogram. 3-D sonography was also   performed.    FINDINGS:  Uterus: Retroverted. Measures 9.2 x 6.2 x 6.8 cm. A single intrauterine   gestation is again identified, as detailed below.    Gestational Sac Size (mean): 4.4 cm  Gestational Sac Shape: Rounded with mildly irregular margin  Crown Rump Length: 2.46 cm  Estimated Gestational Age: 9 weeks 1 day by crown rump length.  Yolk Sac: Not visualized.  Fetal Heart Rate: None detected.    The gestation appears to be in a high left lateral location with markedly   thinned overlying myometrium, raising suspicion for an interstitial   ectopic pregnancy. Question division of the endometrium near the fundus,   raising the possibility for a uterine anomaly such as septate uterus,   though this is thought to be unlikely.    Right ovary: 4.2 cm x 1.4 cm x 2.0 cm. Within normal limits.  Left ovary: 2.3 cm x 1.7 cm x 1.6 cm. Within normal limits.    Fluid: Trace free fluid in the pelvis.    IMPRESSION:  Redemonstrated fetal demise. The gestation appears to be in a high left   lateral location with markedly thinned overlying myometrium, suspicious   for an interstitial ectopic pregnancy.    Questionable division of the endometrium near the fundus, raising the   possibility for uterine anomaly such as septate uterus, though this is   thought to be unlikely.    Consider pelvic MRI for further evaluation.      Findings were discussed with Dr. Yunior Armstrong 8/15/2023 5:16 PM by Dr. Coley with read back confirmation.    --- End of Report ---            RADHA BARROSO MD; Attending Radiologist  This document has been electronically signed. Aug 15 2023  5:23PM    < end of copied text >         JEANNE PUENTES  32y  Female [id number]  - Hx obtained via a combination of chart review and per patient     HPI: 33yo  @ 14.4wga (see below regarding clarification) by LMP of 2023 who presents to the ED for further evaluation of a failed medical termination of pregnancy. Patient underwent a medical termination of pregnancy on 2023 at [facility] with subsequent passage of tissue at 9.3wga. Reports passing tissue at home but ultimately did not go to a follow-up visit on . Ultimately followed up on  and was referred to [facility] given concerns of retained POCs. TVUS obtained then showed an IUP w/ EGA of 9.5wga w/ no FH. There is a hand-written note scanned from [facility] that states that a D&C was attempted under ultrasound guidance which was unsuccessful. Currently w/o complaints of bleeding. Intermittent cramping noted.     Patient was initially scheduled for a D&C today but was scanned today by [facility] and there was concern for interstitial/cornual pregnancy and/or placental accreta spectrum     Name of GYN Physician: [facility]     POB: Reports x2 full term pregnancies ( and , both of which were c/s. First for breech). Prior documentation noted that the first may have been pre-term  PGyn: Above, IUD removal (describes multiple attempts to remove it including potential issues with being embedded and/or broken)   MedHx: Denies   SurgHx: Above, D&C x2 as above   Meds: None  Allergies: NKDA  Social: Marijuana use       Vital Signs Last 24 Hrs  T(C): 36.8 (15 Aug 2023 13:26), Max: 36.8 (15 Aug 2023 13:26)  T(F): 98.3 (15 Aug 2023 13:26), Max: 98.3 (15 Aug 2023 13:26)  HR: 77 (15 Aug 2023 14:40) (77 - 100)  BP: 104/60 (15 Aug 2023 14:40) (104/60 - 116/54)  BP(mean): --  RR: 15 (15 Aug 2023 14:40) (15 - 18)  SpO2: 99% (15 Aug 2023 14:40) (99% - 99%)    Parameters below as of 15 Aug 2023 14:40  Patient On (Oxygen Delivery Method): room air        Physical Exam:   General: Awake. Alert. NAD. Sitting up and conversive w/ examiner and support person   Lungs: Unlabored breathing. No respiratory distress   Abd: Soft, non-tender, non-distended. No pain w/ jostling of stretcher    Ext: No calf tenderness bilaterally    LABS:                              12.0   6.85  )-----------( 401      ( 14 Aug 2023 10:02 )             38.6     08-    139  |  103  |  8   ----------------------------<  82  3.8   |  25  |  0.83    Ca    9.5      14 Aug 2023 10:02      I&O's Detail      Urinalysis Basic - ( 14 Aug 2023 10:02 )    Color: x / Appearance: x / SG: x / pH: x  Gluc: 82 mg/dL / Ketone: x  / Bili: x / Urobili: x   Blood: x / Protein: x / Nitrite: x   Leuk Esterase: x / RBC: x / WBC x   Sq Epi: x / Non Sq Epi: x / Bacteria: x        RADIOLOGY & ADDITIONAL STUDIES:    < from: US Transvaginal, OB (08.15.23 @ 16:00) >  ACC: [id number] EXAM:  US 3D RENDERING W WORKSTATION   ORDERED BY: PJ ESTRADA     ACC: [id number] EXAM:  US OB TRANSVAGINAL   ORDERED BY: PJ ESTRADA     PROCEDURE DATE:  08/15/2023          INTERPRETATION:  CLINICAL INFORMATION: Knownfetal demise. Concern for   cornual or interstitial ectopic pregnancy.    LMP: 2023    Estimated Gestational Age by LMP: 14 weeks and 3 days    COMPARISON: Pelvic ultrasound 2023.    Endovaginal and transabdominal pelvic sonogram. 3-D sonography was also   performed.    FINDINGS:  Uterus: Retroverted. Measures 9.2 x 6.2 x 6.8 cm. A single intrauterine   gestation is again identified, as detailed below.    Gestational Sac Size (mean): 4.4 cm  Gestational Sac Shape: Rounded with mildly irregular margin  Crown Rump Length: 2.46 cm  Estimated Gestational Age: 9 weeks 1 day by crown rump length.  Yolk Sac: Not visualized.  Fetal Heart Rate: None detected.    The gestation appears to be in a high left lateral location with markedly   thinned overlying myometrium, raising suspicion for an interstitial   ectopic pregnancy. Question division of the endometrium near the fundus,   raising the possibility for a uterine anomaly such as septate uterus,   though this is thought to be unlikely.    Right ovary: 4.2 cm x 1.4 cm x 2.0 cm. Within normal limits.  Left ovary: 2.3 cm x 1.7 cm x 1.6 cm. Within normal limits.    Fluid: Trace free fluid in the pelvis.    IMPRESSION:  Redemonstrated fetal demise. The gestation appears to be in a high left   lateral location with markedly thinned overlying myometrium, suspicious   for an interstitial ectopic pregnancy.    Questionable division of the endometrium near the fundus, raising the   possibility for uterine anomaly such as septate uterus, though this is   thought to be unlikely.    Consider pelvic MRI for further evaluation.      Findings were discussed with Dr. Yunior Armstrong 8/15/2023 5:16 PM by Dr. Coley with read back confirmation.    --- End of Report ---            RADHA BARROSO MD; Attending Radiologist  This document has been electronically signed. Aug 15 2023  5:23PM    < end of copied text >         JEANNE PUENTES  32y  Female [id number]  - Hx obtained via a combination of chart review and per patient     HPI: 31yo  @ 14.4wga (see below regarding clarification) by LMP of 2023 who presents to the ED for further evaluation of a failed medical termination of pregnancy. Patient underwent a medical termination of pregnancy on 2023 at [facility] with subsequent passage of tissue at 9.3wga. Reports passing tissue at home but ultimately did not go to a follow-up visit on . Ultimately followed up on  and was referred to [facility] given concerns of retained POCs. TVUS obtained then showed an IUP w/ EGA of 9.5wga w/ no FH. There is a hand-written note scanned from [facility] that states that a D&C was attempted under ultrasound guidance which was unsuccessful. Currently w/o complaints of bleeding. Intermittent cramping noted.     Patient was initially scheduled for a D&C today but was scanned today by [facility] and there was concern for interstitial/cornual pregnancy and/or placental accreta spectrum     Name of GYN Physician: [facility]     POB: Reports x2 full term pregnancies ( and , both of which were c/s. First for breech). Prior documentation noted that the first may have been pre-term  PGyn: Above, IUD removal (describes multiple attempts to remove it including potential issues with being embedded and/or broken)   MedHx: Denies   SurgHx: Above, D&C x2 as above   Meds: None  Allergies: NKDA  Social: Marijuana use       Vital Signs Last 24 Hrs  T(C): 36.8 (15 Aug 2023 13:26), Max: 36.8 (15 Aug 2023 13:26)  T(F): 98.3 (15 Aug 2023 13:26), Max: 98.3 (15 Aug 2023 13:26)  HR: 77 (15 Aug 2023 14:40) (77 - 100)  BP: 104/60 (15 Aug 2023 14:40) (104/60 - 116/54)  BP(mean): --  RR: 15 (15 Aug 2023 14:40) (15 - 18)  SpO2: 99% (15 Aug 2023 14:40) (99% - 99%)    Parameters below as of 15 Aug 2023 14:40  Patient On (Oxygen Delivery Method): room air        Physical Exam:   General: Awake. Alert. NAD. Sitting up and conversive w/ examiner and support person   Lungs: Unlabored breathing. No respiratory distress   Abd: Soft, non-tender, non-distended. No pain w/ jostling of stretcher    Ext: No calf tenderness bilaterally    LABS:                              12.0   6.85  )-----------( 401      ( 14 Aug 2023 10:02 )             38.6     08-    139  |  103  |  8   ----------------------------<  82  3.8   |  25  |  0.83    Ca    9.5      14 Aug 2023 10:02      I&O's Detail      Urinalysis Basic - ( 14 Aug 2023 10:02 )    Color: x / Appearance: x / SG: x / pH: x  Gluc: 82 mg/dL / Ketone: x  / Bili: x / Urobili: x   Blood: x / Protein: x / Nitrite: x   Leuk Esterase: x / RBC: x / WBC x   Sq Epi: x / Non Sq Epi: x / Bacteria: x        RADIOLOGY & ADDITIONAL STUDIES:    < from: US Transvaginal, OB (08.15.23 @ 16:00) >  ACC: [id number] EXAM:  US 3D RENDERING W WORKSTATION   ORDERED BY: PJ ESTRADA     ACC: [id number] EXAM:  US OB TRANSVAGINAL   ORDERED BY: PJ ESTRADA     PROCEDURE DATE:  08/15/2023          INTERPRETATION:  CLINICAL INFORMATION: Knownfetal demise. Concern for   cornual or interstitial ectopic pregnancy.    LMP: 2023    Estimated Gestational Age by LMP: 14 weeks and 3 days    COMPARISON: Pelvic ultrasound 2023.    Endovaginal and transabdominal pelvic sonogram. 3-D sonography was also   performed.    FINDINGS:  Uterus: Retroverted. Measures 9.2 x 6.2 x 6.8 cm. A single intrauterine   gestation is again identified, as detailed below.    Gestational Sac Size (mean): 4.4 cm  Gestational Sac Shape: Rounded with mildly irregular margin  Crown Rump Length: 2.46 cm  Estimated Gestational Age: 9 weeks 1 day by crown rump length.  Yolk Sac: Not visualized.  Fetal Heart Rate: None detected.    The gestation appears to be in a high left lateral location with markedly   thinned overlying myometrium, raising suspicion for an interstitial   ectopic pregnancy. Question division of the endometrium near the fundus,   raising the possibility for a uterine anomaly such as septate uterus,   though this is thought to be unlikely.    Right ovary: 4.2 cm x 1.4 cm x 2.0 cm. Within normal limits.  Left ovary: 2.3 cm x 1.7 cm x 1.6 cm. Within normal limits.    Fluid: Trace free fluid in the pelvis.    IMPRESSION:  Redemonstrated fetal demise. The gestation appears to be in a high left   lateral location with markedly thinned overlying myometrium, suspicious   for an interstitial ectopic pregnancy.    Questionable division of the endometrium near the fundus, raising the   possibility for uterine anomaly such as septate uterus, though this is   thought to be unlikely.    Consider pelvic MRI for further evaluation.      Findings were discussed with Dr. Yunior Armstrong 8/15/2023 5:16 PM by Dr. Coley with read back confirmation.    --- End of Report ---            RADHA BARROSO MD; Attending Radiologist  This document has been electronically signed. Aug 15 2023  5:23PM    < end of copied text >         JEANNE PUENTES  32y  Female [id number]  - Hx obtained via a combination of chart review and per patient     HPI: 31yo  @ 14.4wga (see below regarding clarification) by LMP of 2023 who presents to the ED for further evaluation of a failed medical termination of pregnancy. Patient underwent a medical termination of pregnancy on 2023 at [facility] with subsequent passage of tissue at 9.3wga. Reports passing tissue at home but ultimately did not go to a follow-up visit on . Ultimately followed up on  and was sent  to [facility] EDgiven concerns of retained POCs. TVUS obtained then showed an IUP w/ EGA of 9.5wga w/ no FH. There is a hand-written note scanned from [facility] that states that a D&C was attempted under ultrasound guidance which was unsuccessful. She was referred to [facility] office for follow up. She then followed up with Dr. 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 Currently w/o complaints of bleeding. Intermittent cramping noted.     Patient was initially scheduled for a D&C today but was scanned today by [facility] and there was concern for interstitial/cornual pregnancy and/or placental accreta spectrum     Name of GYN Physician: [facility]     POB: Reports x2 full term pregnancies ( and , both of which were c/s. First for breech). Prior documentation noted that the first may have been pre-term  PGyn: Above, IUD removal (describes multiple attempts to remove it including potential issues with being embedded and/or broken)   MedHx: Denies   SurgHx: Above, D&C x2 as above   Meds: None  Allergies: NKDA  Social: Marijuana use       Vital Signs Last 24 Hrs  T(C): 36.8 (15 Aug 2023 13:26), Max: 36.8 (15 Aug 2023 13:26)  T(F): 98.3 (15 Aug 2023 13:26), Max: 98.3 (15 Aug 2023 13:26)  HR: 77 (15 Aug 2023 14:40) (77 - 100)  BP: 104/60 (15 Aug 2023 14:40) (104/60 - 116/54)  BP(mean): --  RR: 15 (15 Aug 2023 14:40) (15 - 18)  SpO2: 99% (15 Aug 2023 14:40) (99% - 99%)    Parameters below as of 15 Aug 2023 14:40  Patient On (Oxygen Delivery Method): room air        Physical Exam:   General: Awake. Alert. NAD. Sitting up and conversive w/ examiner and support person   Lungs: Unlabored breathing. No respiratory distress   Abd: Soft, non-tender, non-distended. No pain w/ jostling of stretcher    Ext: No calf tenderness bilaterally    LABS:                              12.0   6.85  )-----------( 401      ( 14 Aug 2023 10:02 )             38.6     08-    139  |  103  |  8   ----------------------------<  82  3.8   |  25  |  0.83    Ca    9.5      14 Aug 2023 10:02      I&O's Detail      Urinalysis Basic - ( 14 Aug 2023 10:02 )    Color: x / Appearance: x / SG: x / pH: x  Gluc: 82 mg/dL / Ketone: x  / Bili: x / Urobili: x   Blood: x / Protein: x / Nitrite: x   Leuk Esterase: x / RBC: x / WBC x   Sq Epi: x / Non Sq Epi: x / Bacteria: x        RADIOLOGY & ADDITIONAL STUDIES:    < from: US Transvaginal, OB (08.15.23 @ 16:00) >  ACC: [id number] EXAM:  US 3D RENDERING W WORKSTATION   ORDERED BY: PJ ESTRADA     ACC: [id number] EXAM:  US OB TRANSVAGINAL   ORDERED BY: PJ ESTRADA     PROCEDURE DATE:  08/15/2023          INTERPRETATION:  CLINICAL INFORMATION: Knownfetal demise. Concern for   cornual or interstitial ectopic pregnancy.    LMP: 2023    Estimated Gestational Age by LMP: 14 weeks and 3 days    COMPARISON: Pelvic ultrasound 2023.    Endovaginal and transabdominal pelvic sonogram. 3-D sonography was also   performed.    FINDINGS:  Uterus: Retroverted. Measures 9.2 x 6.2 x 6.8 cm. A single intrauterine   gestation is again identified, as detailed below.    Gestational Sac Size (mean): 4.4 cm  Gestational Sac Shape: Rounded with mildly irregular margin  Crown Rump Length: 2.46 cm  Estimated Gestational Age: 9 weeks 1 day by crown rump length.  Yolk Sac: Not visualized.  Fetal Heart Rate: None detected.    The gestation appears to be in a high left lateral location with markedly   thinned overlying myometrium, raising suspicion for an interstitial   ectopic pregnancy. Question division of the endometrium near the fundus,   raising the possibility for a uterine anomaly such as septate uterus,   though this is thought to be unlikely.    Right ovary: 4.2 cm x 1.4 cm x 2.0 cm. Within normal limits.  Left ovary: 2.3 cm x 1.7 cm x 1.6 cm. Within normal limits.    Fluid: Trace free fluid in the pelvis.    IMPRESSION:  Redemonstrated fetal demise. The gestation appears to be in a high left   lateral location with markedly thinned overlying myometrium, suspicious   for an interstitial ectopic pregnancy.    Questionable division of the endometrium near the fundus, raising the   possibility for uterine anomaly such as septate uterus, though this is   thought to be unlikely.    Consider pelvic MRI for further evaluation.      Findings were discussed with Dr. Yunior Armstrong 8/15/2023 5:16 PM by Dr. Coley with read back confirmation.    --- End of Report ---            RADHA BARROSO MD; Attending Radiologist  This document has been electronically signed. Aug 15 2023  5:23PM    < end of copied text >         JEANNE PUENTES  32y  Female [id number]  - Hx obtained via a combination of chart review and per patient     HPI: 31yo  @ 14.4wga (see below regarding clarification) by LMP of 2023 who presents to the ED for further evaluation of a failed medical termination of pregnancy. Patient underwent a medical termination of pregnancy on 2023 at [facility] with subsequent passage of tissue at 9.3wga. Reports passing tissue at home but ultimately did not go to a follow-up visit on . Ultimately followed up on  and was sent  to [facility] EDgiven concerns of retained POCs. TVUS obtained then showed an IUP w/ EGA of 9.5wga w/ no FH. There is a hand-written note scanned from [facility] that states that a D&C was attempted under ultrasound guidance which was unsuccessful. She was referred to [facility] for follow up. She then followed up with Dr. 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 Currently w/o complaints of bleeding. Intermittent cramping noted.     Patient was initially scheduled for a D&C today but was scanned today by [facility] and there was concern for interstitial/cornual pregnancy and/or placental accreta spectrum     Name of GYN Physician: [facility]     POB: Reports x2 full term pregnancies ( and , both of which were c/s. First for breech). Prior documentation noted that the first may have been pre-term  PGyn: Above, IUD removal (describes multiple attempts to remove it including potential issues with being embedded and/or broken)   MedHx: Denies   SurgHx: Above, D&C x2 as above   Meds: None  Allergies: NKDA  Social: Marijuana use       Vital Signs Last 24 Hrs  T(C): 36.8 (15 Aug 2023 13:26), Max: 36.8 (15 Aug 2023 13:26)  T(F): 98.3 (15 Aug 2023 13:26), Max: 98.3 (15 Aug 2023 13:26)  HR: 77 (15 Aug 2023 14:40) (77 - 100)  BP: 104/60 (15 Aug 2023 14:40) (104/60 - 116/54)  BP(mean): --  RR: 15 (15 Aug 2023 14:40) (15 - 18)  SpO2: 99% (15 Aug 2023 14:40) (99% - 99%)    Parameters below as of 15 Aug 2023 14:40  Patient On (Oxygen Delivery Method): room air        Physical Exam:   General: Awake. Alert. NAD. Sitting up and conversive w/ examiner and support person   Lungs: Unlabored breathing. No respiratory distress   Abd: Soft, non-tender, non-distended. No pain w/ jostling of stretcher    Ext: No calf tenderness bilaterally    LABS:                              12.0   6.85  )-----------( 401      ( 14 Aug 2023 10:02 )             38.6     08-    139  |  103  |  8   ----------------------------<  82  3.8   |  25  |  0.83    Ca    9.5      14 Aug 2023 10:02      I&O's Detail      Urinalysis Basic - ( 14 Aug 2023 10:02 )    Color: x / Appearance: x / SG: x / pH: x  Gluc: 82 mg/dL / Ketone: x  / Bili: x / Urobili: x   Blood: x / Protein: x / Nitrite: x   Leuk Esterase: x / RBC: x / WBC x   Sq Epi: x / Non Sq Epi: x / Bacteria: x        RADIOLOGY & ADDITIONAL STUDIES:    < from: US Transvaginal, OB (08.15.23 @ 16:00) >  ACC: [id number] EXAM:  US 3D RENDERING W WORKSTATION   ORDERED BY: PJ ESTRADA     ACC: [id number] EXAM:  US OB TRANSVAGINAL   ORDERED BY: PJ ESTRADA     PROCEDURE DATE:  08/15/2023          INTERPRETATION:  CLINICAL INFORMATION: Knownfetal demise. Concern for   cornual or interstitial ectopic pregnancy.    LMP: 2023    Estimated Gestational Age by LMP: 14 weeks and 3 days    COMPARISON: Pelvic ultrasound 2023.    Endovaginal and transabdominal pelvic sonogram. 3-D sonography was also   performed.    FINDINGS:  Uterus: Retroverted. Measures 9.2 x 6.2 x 6.8 cm. A single intrauterine   gestation is again identified, as detailed below.    Gestational Sac Size (mean): 4.4 cm  Gestational Sac Shape: Rounded with mildly irregular margin  Crown Rump Length: 2.46 cm  Estimated Gestational Age: 9 weeks 1 day by crown rump length.  Yolk Sac: Not visualized.  Fetal Heart Rate: None detected.    The gestation appears to be in a high left lateral location with markedly   thinned overlying myometrium, raising suspicion for an interstitial   ectopic pregnancy. Question division of the endometrium near the fundus,   raising the possibility for a uterine anomaly such as septate uterus,   though this is thought to be unlikely.    Right ovary: 4.2 cm x 1.4 cm x 2.0 cm. Within normal limits.  Left ovary: 2.3 cm x 1.7 cm x 1.6 cm. Within normal limits.    Fluid: Trace free fluid in the pelvis.    IMPRESSION:  Redemonstrated fetal demise. The gestation appears to be in a high left   lateral location with markedly thinned overlying myometrium, suspicious   for an interstitial ectopic pregnancy.    Questionable division of the endometrium near the fundus, raising the   possibility for uterine anomaly such as septate uterus, though this is   thought to be unlikely.    Consider pelvic MRI for further evaluation.      Findings were discussed with Dr. Yunior Armstrong 8/15/2023 5:16 PM by Dr. Coley with read back confirmation.    --- End of Report ---            RADHA BARROSO MD; Attending Radiologist  This document has been electronically signed. Aug 15 2023  5:23PM    < end of copied text >         JEANNE PUENTES  32y  Female [id number]  - Hx obtained via a combination of chart review and per patient     HPI: 33yo  @ 14.4wga (see below regarding clarification) by LMP of 2023 who presents to the ED for further evaluation of a failed medical termination of pregnancy. Patient underwent a medical termination of pregnancy on 2023 at [facility] with subsequent passage of tissue at 9.3wga. Reports passing tissue at home but ultimately did not go to a follow-up visit on . Ultimately followed up on  and was sent  to [facility] EDgiven concerns of retained POCs. TVUS obtained then showed an IUP w/ EGA of 9.5wga w/ no FH. There is a hand-written note scanned from [facility] that states that a D&C was attempted under ultrasound guidance which was unsuccessful. She was referred to [facility] office for follow up. She then followed up with Dr. 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 Currently w/o complaints of bleeding. Intermittent cramping noted.     Patient was initially scheduled for a D&C today but was scanned today by [facility] and there was concern for interstitial/cornual pregnancy and/or placental accreta spectrum     Name of GYN Physician: [facility]     POB: Reports x2 full term pregnancies ( and , both of which were c/s. First for breech). Prior documentation noted that the first may have been pre-term  PGyn: Above, IUD removal (describes multiple attempts to remove it including potential issues with being embedded and/or broken)   MedHx: Denies   SurgHx: Above, D&C x2 as above   Meds: None  Allergies: NKDA  Social: Marijuana use       Vital Signs Last 24 Hrs  T(C): 36.8 (15 Aug 2023 13:26), Max: 36.8 (15 Aug 2023 13:26)  T(F): 98.3 (15 Aug 2023 13:26), Max: 98.3 (15 Aug 2023 13:26)  HR: 77 (15 Aug 2023 14:40) (77 - 100)  BP: 104/60 (15 Aug 2023 14:40) (104/60 - 116/54)  BP(mean): --  RR: 15 (15 Aug 2023 14:40) (15 - 18)  SpO2: 99% (15 Aug 2023 14:40) (99% - 99%)    Parameters below as of 15 Aug 2023 14:40  Patient On (Oxygen Delivery Method): room air        Physical Exam:   General: Awake. Alert. NAD. Sitting up and conversive w/ examiner and support person   Lungs: Unlabored breathing. No respiratory distress   Abd: Soft, non-tender, non-distended. No pain w/ jostling of stretcher    Ext: No calf tenderness bilaterally    LABS:                              12.0   6.85  )-----------( 401      ( 14 Aug 2023 10:02 )             38.6     08-    139  |  103  |  8   ----------------------------<  82  3.8   |  25  |  0.83    Ca    9.5      14 Aug 2023 10:02      I&O's Detail      Urinalysis Basic - ( 14 Aug 2023 10:02 )    Color: x / Appearance: x / SG: x / pH: x  Gluc: 82 mg/dL / Ketone: x  / Bili: x / Urobili: x   Blood: x / Protein: x / Nitrite: x   Leuk Esterase: x / RBC: x / WBC x   Sq Epi: x / Non Sq Epi: x / Bacteria: x        RADIOLOGY & ADDITIONAL STUDIES:    < from: US Transvaginal, OB (08.15.23 @ 16:00) >  ACC: [id number] EXAM:  US 3D RENDERING W WORKSTATION   ORDERED BY: PJ ESTRADA     ACC: [id number] EXAM:  US OB TRANSVAGINAL   ORDERED BY: PJ ESTRADA     PROCEDURE DATE:  08/15/2023          INTERPRETATION:  CLINICAL INFORMATION: Knownfetal demise. Concern for   cornual or interstitial ectopic pregnancy.    LMP: 2023    Estimated Gestational Age by LMP: 14 weeks and 3 days    COMPARISON: Pelvic ultrasound 2023.    Endovaginal and transabdominal pelvic sonogram. 3-D sonography was also   performed.    FINDINGS:  Uterus: Retroverted. Measures 9.2 x 6.2 x 6.8 cm. A single intrauterine   gestation is again identified, as detailed below.    Gestational Sac Size (mean): 4.4 cm  Gestational Sac Shape: Rounded with mildly irregular margin  Crown Rump Length: 2.46 cm  Estimated Gestational Age: 9 weeks 1 day by crown rump length.  Yolk Sac: Not visualized.  Fetal Heart Rate: None detected.    The gestation appears to be in a high left lateral location with markedly   thinned overlying myometrium, raising suspicion for an interstitial   ectopic pregnancy. Question division of the endometrium near the fundus,   raising the possibility for a uterine anomaly such as septate uterus,   though this is thought to be unlikely.    Right ovary: 4.2 cm x 1.4 cm x 2.0 cm. Within normal limits.  Left ovary: 2.3 cm x 1.7 cm x 1.6 cm. Within normal limits.    Fluid: Trace free fluid in the pelvis.    IMPRESSION:  Redemonstrated fetal demise. The gestation appears to be in a high left   lateral location with markedly thinned overlying myometrium, suspicious   for an interstitial ectopic pregnancy.    Questionable division of the endometrium near the fundus, raising the   possibility for uterine anomaly such as septate uterus, though this is   thought to be unlikely.    Consider pelvic MRI for further evaluation.      Findings were discussed with Dr. Yunior Armstrong 8/15/2023 5:16 PM by Dr. Coley with read back confirmation.    --- End of Report ---            RADHA BARROSO MD; Attending Radiologist  This document has been electronically signed. Aug 15 2023  5:23PM    < end of copied text >

## 2023-08-15 NOTE — ED CLERICAL - NS ED CLERK NOTE PRE-ARRIVAL INFORMATION; ADDITIONAL PRE-ARRIVAL INFORMATION
CC/Reason For referral: concern for ectopic pregnancy. please order 3D pelvic ultrasound  Preferred Consultant(if applicable): gyn  Who admits for you (if needed): Sandhu  Do you have documents you would like to fax over? na  Would you still like to speak to an ED attending? call gyn @ [phone] CC/Reason For referral: concern for ectopic pregnancy. please order 3D pelvic ultrasound  Preferred Consultant(if applicable): gyn  Who admits for you (if needed): Sandhu  Do you have documents you would like to fax over? na  Would you still like to speak to an ED attending? call gyn @ [phone] CC/Reason For referral: concern for ectopic pregnancy. please order 3D pelvic ultrasound  Preferred Consultant(if applicable): gyn  Who admits for you (if needed): Sandhu  Do you have documents you would like to fax over? na  Would you still like to speak to an ED attending? call gyn @ [phone]

## 2023-08-15 NOTE — ED PROVIDER NOTE - OBJECTIVE STATEMENT
33 yo  Female with PMHx significant for Anemia and  (2023) complex recent OBGYN history as detailed below, presents to ED today at behest of outpatient OBGYN clinic for a 3D US of the uterus. Pt denies acute complaints including f/c, abd pain, n/v, active vaginal bleeding/DC, syncope.  Pt reports she missed her menstrual cycle (LMP 23) and was subsequently evaluated at [facility] in [place]. She decided to undergo a medical  and was started an oral "pill" on 23. She subsequently had intermittent abdominal cramping and bleeding over the next few weeks, and did not follow up with the clinic as instructed. Due to persistent symptoms, she [facility] and was told that she had retained products of conception including the dead fetus. She had another round of medical treatment and was re-evaluated, at which time she reportedly had 2 attempted "D&C's" in the clinic which were both unsuccessful. She was then instructed to go to the hospital for higher level of care of her fetal demise. She was evaluated at [facility] where she had an ultrasound and labs that confirmed fetal demise with retained products of conception. She subsequently presented to [facility] ED on 23 for further evaluation of retained POC and underwent Transvaginal U/S which revealed "Fetal demise at 9 weeks and 1 day". 31 yo  Female with PMHx significant for Anemia and  (2023) complex recent OBGYN history as detailed below, presents to ED today at behest of outpatient OBGYN clinic for a 3D US of the uterus. Pt denies acute complaints including f/c, abd pain, n/v, active vaginal bleeding/DC, syncope.  Pt reports she missed her menstrual cycle (LMP 23) and was subsequently evaluated at [facility] in [place]. She decided to undergo a medical  and was started an oral "pill" on 23. She subsequently had intermittent abdominal cramping and bleeding over the next few weeks, and did not follow up with the clinic as instructed. Due to persistent symptoms, she went back to [facility] and was told that she had retained products of conception including the dead fetus. She had another round of medical treatment and was re-evaluated, at which time she reportedly had 2 attempted "D&C's" in the clinic which were both unsuccessful. She was then instructed to go to the hospital for higher level of care of her fetal demise. She was evaluated at [facility] where she had an ultrasound and labs that confirmed fetal demise with retained products of conception. She subsequently presented to [facility] ED on 23 for further evaluation of retained POC and underwent Transvaginal U/S which revealed "Fetal demise at 9 weeks and 1 day". 31 yo  Female with PMHx significant for Anemia and  (2023) complex recent OBGYN history as detailed below, presents to ED today at behest of outpatient OBGYN clinic for a 3D US of the uterus. Pt denies acute complaints including f/c, abd pain, n/v, active vaginal bleeding/DC, syncope.  Pt reports she missed her menstrual cycle (LMP 23) and was subsequently evaluated at [facility] in [place]. She decided to undergo a medical  and was started an oral "pill" on 23. She subsequently had intermittent abdominal cramping and bleeding over the next few weeks, and did not follow up with the clinic as instructed. Due to persistent symptoms, she went back to [facility] and was told that she had retained products of conception including the dead fetus. She had another round of medical treatment and was re-evaluated, at which time she reportedly had 2 attempted "D&C's" in the clinic which were both unsuccessful. She was then instructed to go to the hospital for higher level of care of her fetal demise. She was evaluated at [facility] where she had an ultrasound and labs that confirmed fetal demise with retained products of conception. She subsequently presented to [facility] ED on 23 for further evaluation of retained POC and underwent Transvaginal U/S which revealed "Fetal demise at 9 weeks and 1 day".

## 2023-08-15 NOTE — ED PROVIDER NOTE - CLINICAL SUMMARY MEDICAL DECISION MAKING FREE TEXT BOX
Audrey Bee, Attending Physician: 32F sent in by OBGYN for concern for cornual ectopic vs. retained products. Pt hemodynamically stable. Will trend labs, obtain 3D ultrasound per OBGYN recommendations and disposition for CDU vs. admission for MRI vs. surgical intervention if US non-diagnostic.

## 2023-08-15 NOTE — ED ADULT NURSE NOTE - NSFALLUNIVINTERV_ED_ALL_ED
Bed/Stretcher in lowest position, wheels locked, appropriate side rails in place/Call bell, personal items and telephone in reach/Instruct patient to call for assistance before getting out of bed/chair/stretcher/Non-slip footwear applied when patient is off stretcher/[place] to call system/Physically safe environment - no spills, clutter or unnecessary equipment/Purposeful proactive rounding/Room/bathroom lighting operational, light cord in reach Bed/Stretcher in lowest position, wheels locked, appropriate side rails in place/Call bell, personal items and telephone in reach/Instruct patient to call for assistance before getting out of bed/chair/stretcher/Non-slip footwear applied when patient is off stretcher/[place] to call system/Physically safe environment - no spills, clutter or unnecessary equipment/Purposeful proactive rounding/Room/bathroom lighting operational, light cord in reach Bed/Stretcher in lowest position, wheels locked, appropriate side rails in place/Call bell, personal items and telephone in reach/Instruct patient to call for assistance before getting out of bed/chair/stretcher/Non-slip footwear applied when patient is off stretcher/[place] to call system/Physically safe environment - no spills, clutter or unnecessary equipment/Purposeful proactive rounding/Room/bathroom lighting operational, light cord in reach

## 2023-08-16 ENCOUNTER — TRANSCRIPTION ENCOUNTER (OUTPATIENT)
Age: 33
End: 2023-08-16

## 2023-08-16 LAB
APTT BLD: 29.4 SEC — SIGNIFICANT CHANGE UP (ref 24.5–35.6)
BASOPHILS # BLD AUTO: 0.03 K/UL — SIGNIFICANT CHANGE UP (ref 0–0.2)
BASOPHILS NFR BLD AUTO: 0.4 % — SIGNIFICANT CHANGE UP (ref 0–2)
EOSINOPHIL # BLD AUTO: 0.04 K/UL — SIGNIFICANT CHANGE UP (ref 0–0.5)
EOSINOPHIL NFR BLD AUTO: 0.5 % — SIGNIFICANT CHANGE UP (ref 0–6)
FIBRINOGEN PPP-MCNC: 393 MG/DL — SIGNIFICANT CHANGE UP (ref 200–445)
HCT VFR BLD CALC: 35.4 % — SIGNIFICANT CHANGE UP (ref 34.5–45)
HGB BLD-MCNC: 11.4 G/DL — LOW (ref 11.5–15.5)
IMM GRANULOCYTES NFR BLD AUTO: 0.4 % — SIGNIFICANT CHANGE UP (ref 0–0.9)
INR BLD: 1.13 RATIO — SIGNIFICANT CHANGE UP (ref 0.85–1.18)
LYMPHOCYTES # BLD AUTO: 1.98 K/UL — SIGNIFICANT CHANGE UP (ref 1–3.3)
LYMPHOCYTES # BLD AUTO: 23.7 % — SIGNIFICANT CHANGE UP (ref 13–44)
MCHC RBC-ENTMCNC: 30 PG — SIGNIFICANT CHANGE UP (ref 27–34)
MCHC RBC-ENTMCNC: 32.2 GM/DL — SIGNIFICANT CHANGE UP (ref 32–36)
MCV RBC AUTO: 93.2 FL — SIGNIFICANT CHANGE UP (ref 80–100)
MONOCYTES # BLD AUTO: 0.92 K/UL — HIGH (ref 0–0.9)
MONOCYTES NFR BLD AUTO: 11 % — SIGNIFICANT CHANGE UP (ref 2–14)
NEUTROPHILS # BLD AUTO: 5.34 K/UL — SIGNIFICANT CHANGE UP (ref 1.8–7.4)
NEUTROPHILS NFR BLD AUTO: 64 % — SIGNIFICANT CHANGE UP (ref 43–77)
NRBC # BLD: 0 /100 WBCS — SIGNIFICANT CHANGE UP (ref 0–0)
PLATELET # BLD AUTO: 387 K/UL — SIGNIFICANT CHANGE UP (ref 150–400)
PROTHROM AB SERPL-ACNC: 12.4 SEC — SIGNIFICANT CHANGE UP (ref 9.5–13)
RBC # BLD: 3.8 M/UL — SIGNIFICANT CHANGE UP (ref 3.8–5.2)
RBC # FLD: 12.3 % — SIGNIFICANT CHANGE UP (ref 10.3–14.5)
WBC # BLD: 8.34 K/UL — SIGNIFICANT CHANGE UP (ref 3.8–10.5)
WBC # FLD AUTO: 8.34 K/UL — SIGNIFICANT CHANGE UP (ref 3.8–10.5)

## 2023-08-16 PROCEDURE — 59136 TREAT ECTOPIC PREGNANCY: CPT

## 2023-08-16 PROCEDURE — 88305 TISSUE EXAM BY PATHOLOGIST: CPT | Mod: 26

## 2023-08-16 PROCEDURE — 88342 IMHCHEM/IMCYTCHM 1ST ANTB: CPT | Mod: 26

## 2023-08-16 PROCEDURE — 58700 REMOVAL OF FALLOPIAN TUBE: CPT

## 2023-08-16 DEVICE — VISTASEAL FIBRIN HUMAN 4ML: Type: IMPLANTABLE DEVICE | Status: FUNCTIONAL

## 2023-08-16 DEVICE — INTERCEED 5 X 6" XL: Type: IMPLANTABLE DEVICE | Status: FUNCTIONAL

## 2023-08-16 RX ORDER — IBUPROFEN 200 MG
600 TABLET ORAL EVERY 6 HOURS
Refills: 0 | Status: DISCONTINUED | OUTPATIENT
Start: 2023-08-16 | End: 2023-08-17

## 2023-08-16 RX ORDER — ACETAMINOPHEN 500 MG
975 TABLET ORAL EVERY 6 HOURS
Refills: 0 | Status: DISCONTINUED | OUTPATIENT
Start: 2023-08-16 | End: 2023-08-17

## 2023-08-16 RX ORDER — OXYCODONE HYDROCHLORIDE 5 MG/1
1 TABLET ORAL
Qty: 5 | Refills: 0
Start: 2023-08-16

## 2023-08-16 RX ORDER — ONDANSETRON 8 MG/1
4 TABLET, FILM COATED ORAL ONCE
Refills: 0 | Status: DISCONTINUED | OUTPATIENT
Start: 2023-08-16 | End: 2023-08-16

## 2023-08-16 RX ORDER — HYDROMORPHONE HYDROCHLORIDE 2 MG/ML
0.5 INJECTION INTRAMUSCULAR; INTRAVENOUS; SUBCUTANEOUS ONCE
Refills: 0 | Status: DISCONTINUED | OUTPATIENT
Start: 2023-08-16 | End: 2023-08-16

## 2023-08-16 RX ORDER — HYDROMORPHONE HYDROCHLORIDE 2 MG/ML
0.5 INJECTION INTRAMUSCULAR; INTRAVENOUS; SUBCUTANEOUS
Refills: 0 | Status: DISCONTINUED | OUTPATIENT
Start: 2023-08-16 | End: 2023-08-16

## 2023-08-16 RX ORDER — SIMETHICONE 80 MG/1
80 TABLET, CHEWABLE ORAL EVERY 6 HOURS
Refills: 0 | Status: DISCONTINUED | OUTPATIENT
Start: 2023-08-16 | End: 2023-08-17

## 2023-08-16 RX ORDER — SODIUM CHLORIDE 9 MG/ML
1000 INJECTION, SOLUTION INTRAVENOUS
Refills: 0 | Status: DISCONTINUED | OUTPATIENT
Start: 2023-08-16 | End: 2023-08-17

## 2023-08-16 RX ORDER — HEPARIN SODIUM 5000 [USP'U]/ML
5000 INJECTION INTRAVENOUS; SUBCUTANEOUS EVERY 12 HOURS
Refills: 0 | Status: DISCONTINUED | OUTPATIENT
Start: 2023-08-16 | End: 2023-08-17

## 2023-08-16 RX ORDER — OXYCODONE HYDROCHLORIDE 5 MG/1
1 TABLET ORAL
Qty: 10 | Refills: 0
Start: 2023-08-16

## 2023-08-16 RX ORDER — OXYCODONE HYDROCHLORIDE 5 MG/1
5 TABLET ORAL EVERY 6 HOURS
Refills: 0 | Status: DISCONTINUED | OUTPATIENT
Start: 2023-08-16 | End: 2023-08-17

## 2023-08-16 RX ORDER — HYDROMORPHONE HYDROCHLORIDE 2 MG/ML
1 INJECTION INTRAMUSCULAR; INTRAVENOUS; SUBCUTANEOUS
Refills: 0 | Status: DISCONTINUED | OUTPATIENT
Start: 2023-08-16 | End: 2023-08-16

## 2023-08-16 RX ADMIN — HYDROMORPHONE HYDROCHLORIDE 0.5 MILLIGRAM(S): 2 INJECTION INTRAMUSCULAR; INTRAVENOUS; SUBCUTANEOUS at 14:30

## 2023-08-16 RX ADMIN — HYDROMORPHONE HYDROCHLORIDE 0.5 MILLIGRAM(S): 2 INJECTION INTRAMUSCULAR; INTRAVENOUS; SUBCUTANEOUS at 16:59

## 2023-08-16 RX ADMIN — SIMETHICONE 80 MILLIGRAM(S): 80 TABLET, CHEWABLE ORAL at 22:26

## 2023-08-16 RX ADMIN — HYDROMORPHONE HYDROCHLORIDE 0.5 MILLIGRAM(S): 2 INJECTION INTRAMUSCULAR; INTRAVENOUS; SUBCUTANEOUS at 16:15

## 2023-08-16 RX ADMIN — HYDROMORPHONE HYDROCHLORIDE 0.5 MILLIGRAM(S): 2 INJECTION INTRAMUSCULAR; INTRAVENOUS; SUBCUTANEOUS at 14:45

## 2023-08-16 NOTE — BRIEF OPERATIVE NOTE - NSICDXBRIEFPROCEDURE_GEN_ALL_CORE_FT
PROCEDURES:  Resection of cornu of fallopian tube 16-Aug-2023 18:16:57  Francia Mulligan  Excision, mass, uterus, laparoscopic 16-Aug-2023 18:17:42  Francia Mulligan

## 2023-08-16 NOTE — DISCHARGE NOTE PROVIDER - NSDCMRMEDTOKEN_GEN_ALL_CORE_FT
Motrin 600 mg oral tablet: 1 orally every 6 hours  Tylenol 325 mg oral tablet: 3 orally every 6 hours   Motrin 600 mg oral tablet: 1 orally every 6 hours  oxyCODONE 5 mg oral tablet: 1 tab(s) orally every 4 hours as needed for  moderate pain take one tablet every 4 hours for moderate to severe pain. MDD: 4  Tylenol 325 mg oral tablet: 3 orally every 6 hours

## 2023-08-16 NOTE — DISCHARGE NOTE PROVIDER - NSDCFUADDINST_GEN_ALL_CORE_FT
Alternate Tylenol 975mg q6hrs with Motrin 600mg q6hrs so that you are taking pain medicine every 3 hrs. Take oxycodone for breakthrough pain. Vaginal spotting for the next couple of days is normal.  If heavy vaginal bleeding, foul smelling discharge, fevers, or pain not controlled with oral pain meds, please contact your provider or go to the emergency room.  Nothing in the vagina for the next two weeks. Do not soak in water or swim for the next 2 weeks. Please see [facility] for a 2 week follow-up. Alternate Tylenol 975mg q6hrs with Motrin 600mg q6hrs so that you are taking pain medicine every 3 hrs. Take oxycodone for breakthrough pain. Vaginal spotting for the next couple of days is normal.  If heavy vaginal bleeding, foul smelling discharge, fevers, or pain not controlled with oral pain meds, please contact your provider or go to the emergency room.  Nothing in the vagina for the next two weeks. Do not soak in water or swim for the next 2 weeks. Please see [facility] for a 2 week follow-up. Alternate Tylenol 975mg q6hrs with Motrin 600mg q6hrs so that you are taking pain medicine every 3 hrs. Take oxycodone for breakthrough pain. Vaginal spotting for the next couple of days is normal.  If heavy vaginal bleeding, foul smelling discharge, fevers, or pain not controlled with oral pain meds, please contact your provider or go to the emergency room.  Nothing in the vagina for the next two weeks. Do not soak in water or swim for the next 2 weeks. Please see [facility] for a 2 week follow-up. Alternate Tylenol 975mg q6hrs with Motrin 600mg q6hrs so that you are taking pain medicine every 3 hrs. Take oxycodone for breakthrough pain, every 4 hours, max 4 pills per day. Vaginal spotting for the next couple of days is normal.  If heavy vaginal bleeding, foul smelling discharge, fevers, or pain not controlled with oral pain meds, please contact your provider or go to the emergency room.  Nothing in the vagina for the next two weeks. Do not soak in water or swim for the next 2 weeks. Please see [facility] for a 2 week follow-up. Alternate Tylenol 975mg q6hrs with Motrin 600mg q6hrs so that you are taking pain medicine every 3 hrs. Take oxycodone for breakthrough pain, every 4 hours, max 4 pills per day. Vaginal spotting for the next couple of days is normal.  If heavy vaginal bleeding, foul smelling discharge, fevers, or pain not controlled with oral pain meds, please contact your provider or go to the emergency room.  Nothing in the vagina for the next two weeks. Do not soak in water or swim for the next 2 weeks. Please see [facility] for a 2 week follow-up. Alternate Tylenol 975mg q6hrs with Motrin 600mg q6hrs so that you are taking pain medicine every 3 hrs. Take oxycodone for breakthrough pain, every 4 hours, max 4 pills per day. Vaginal spotting for the next couple of days is normal.  If heavy vaginal bleeding, foul smelling discharge, fevers, or pain not controlled with oral pain meds, please contact your provider or go to the emergency room.  Nothing in the vagina for the next two weeks. Do not soak in water or swim for the next 2 weeks. Please see [facility] for a 2 week follow-up. Alternate Tylenol 975mg q6hrs with Motrin 600mg q6hrs so that you are taking pain medicine every 3 hrs. Take oxycodone for breakthrough pain, every 6 hours, max 4 pills per day. Vaginal spotting for the next couple of days is normal.  If heavy vaginal bleeding, foul smelling discharge, fevers, or pain not controlled with oral pain meds, please contact your provider or go to the emergency room.  Nothing in the vagina for the next two weeks. Do not soak in water or swim for the next 2 weeks. Please see [facility] versus Dr. Gu for a 2 week follow-up. Alternate Tylenol 975mg q6hrs with Motrin 600mg q6hrs so that you are taking pain medicine every 3 hrs. Take oxycodone for breakthrough pain, every 6 hours, max 4 pills per day. Vaginal spotting for the next couple of days is normal.  If heavy vaginal bleeding, foul smelling discharge, fevers, or pain not controlled with oral pain meds, please contact your provider or go to the emergency room.  Nothing in the vagina for the next two weeks. Do not soak in water or swim for the next 2 weeks. Please see [facility] versus Dr. Gu for a 2 week follow-up. Alternate Tylenol 975mg q6hrs with Motrin 600mg q6hrs so that you are taking pain medicine every 3 hrs. Take oxycodone for breakthrough pain, every 6 hours, max 4 pills per day. Vaginal spotting for the next couple of days is normal.  If heavy vaginal bleeding, foul smelling discharge, fevers, or pain not controlled with oral pain meds, please contact your provider or go to the emergency room.  Nothing in the vagina for the next two weeks. Do not soak in water or swim for the next 2 weeks. Please see [facility] versus Dr. Gu for a 2 week follow-up.

## 2023-08-16 NOTE — DISCHARGE NOTE PROVIDER - HOSPITAL COURSE
33 y/o  @ 14w 4d presented for scheduled surgery. Patient underwent *. EBL was *. Surgery was uncomplicated. Please see operative report for details.       On POD #0, pt was advanced to a regular diet, renner was discontinued and patient was able to void spontaneously.  On POD#1, pt was discharged in stable condition, ambulating, tolerating po and voiding spontaneously. The patient to have close follow-up with . *    On POD#1, patient was transitioned to a regular diet and renner was discontinued. Patient was able to void spontaneously.   On POD#2, the patient was discharge in stable condition. She was ambulating, tolerating PO, and voiding spontaneously. The patient is to have close follow-up with  * 31 y/o  @ 14w 4d presented for scheduled surgery. Patient underwent *. EBL was *. Surgery was uncomplicated. Please see operative report for details.       On POD #0, pt was advanced to a regular diet, renner was discontinued and patient was able to void spontaneously.  On POD#1, pt was discharged in stable condition, ambulating, tolerating po and voiding spontaneously. The patient to have close follow-up with . *    On POD#1, patient was transitioned to a regular diet and renner was discontinued. Patient was able to void spontaneously.   On POD#2, the patient was discharge in stable condition. She was ambulating, tolerating PO, and voiding spontaneously. The patient is to have close follow-up with  * 31 y/o  @ 14w 4d sent in for evaluation of suspected cornual ectopic which was confirmed on imaging. Patient underwent a wedge resection for a cornual ectopic on HD#2. EBL was . Surgery was uncomplicated. Please see operative report for details.     On POD #0, pt was advanced to a regular diet, Mcdaniel was discontinued and patient was able to void spontaneously. She was discharged on this day in stable condition and normal vital signs. She was ambulating, tolerating PO, and pain was controlled. The patient is to have close follow-up with [facility]. 33 y/o  @ 14w 4d sent in for evaluation of suspected cornual ectopic which was confirmed on imaging. Patient underwent a wedge resection for a cornual ectopic on HD#2. EBL was . Surgery was uncomplicated. Please see operative report for details.     On POD #0, pt was advanced to a regular diet, Mcdaniel was discontinued and patient was able to void spontaneously. She was discharged on this day in stable condition and normal vital signs. She was ambulating, tolerating PO, and pain was controlled. The patient is to have close follow-up with [facility] clinic. 33 y/o  @14w 4d by LMP sent in for evaluation of suspected cornual ectopic after two failed medical terminations and 1 D&C. 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 EBL was . Surgery was uncomplicated. Please see operative report for details.     On POD #0, pt was advanced to a regular diet, Mcdaniel was discontinued and patient was able to void spontaneously. She was discharged on this day in stable condition and normal vital signs. She was ambulating, tolerating PO, and pain was controlled. The patient is to have close follow-up with [facility] clinic. 31 y/o  @14w 4d by LMP sent in for evaluation of suspected cornual ectopic after two failed medical terminations and 1 D&C. 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 EBL was . Surgery was uncomplicated. Please see operative report for details.     On POD #0, pt was advanced to a regular diet, Mcdaniel was discontinued and patient was able to void spontaneously. She was discharged on this day in stable condition and normal vital signs. She was ambulating, tolerating PO, and pain was controlled. The patient is to have close follow-up with [facility] clinic. 33 y/o  @14w 4d by LMP sent in for evaluation of suspected cornual ectopic after two failed medical terminations and 1 D&C. 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 Surgery was uncomplicated. Please see operative report for details.     On POD #0, pt was advanced to a regular diet, Mcdaniel was discontinued and patient was able to void spontaneously. She was discharged on this day in stable condition and normal vital signs. She was ambulating, tolerating PO, and pain was controlled. The patient is to have close follow-up with [facility] clinic. 31 y/o  @14w 4d by LMP sent in for evaluation of suspected cornual ectopic after two failed medical terminations and 1 D&C. 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 Surgery was uncomplicated. Please see operative report for details.     On POD #0, pt was advanced to a regular diet, Mcdaniel was discontinued and patient was able to void spontaneously. She was discharged on this day in stable condition and normal vital signs. She was ambulating, tolerating PO, and pain was controlled. The patient is to have close follow-up with [facility] clinic. 33 y/o  @14w 4d by LMP sent in for evaluation of suspected cornual ectopic after two failed medical terminations and 1 D&C. 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 Surgery was uncomplicated. Please see operative report for details.     On POD #0, pt was advanced to a regular diet, Mcdaniel was discontinued and patient was able to void spontaneously.     On POD#1. She was ambulating, tolerating PO, and pain was controlled. She was discharged on this day in stable condition and normal vital signs. The patient is to have close follow-up with [facility] vs. Dr. Gu 33 y/o  @14w 4d by LMP sent in for evaluation of suspected cornual ectopic after a failed medical termination and x2 attempt outpatient D&Cs. 3D TVUS imaging obtained with likely left-sided interstitial vs. cornual ectopic. Patient underwent laproscopic left-sided cornual wedge resection and left-sided salpingectomy on HD#2. Please see operative note for further details. On POD#0, pt was advanced to a regular diet, Mcdaniel was discontinued and patient was able to void spontaneously. On POD#1. She was ambulating, tolerating PO, and pain was controlled. She was discharged on this day in stable condition and normal vital signs. The patient is to have close follow-up with [facility] clinic vs. Dr. Gu 33 y/o  @14w 4d by LMP sent in for evaluation of suspected cornual ectopic after a failed medical termination and x2 attempt outpatient D&Cs. 3D TVUS imaging obtained with likely left-sided interstitial vs. cornual ectopic. Patient underwent laproscopic left-sided cornual wedge resection and left-sided salpingectomy on HD#2. Please see operative note for further details. On POD#0, pt was advanced to a regular diet, Mcdaniel was discontinued and patient was able to void spontaneously. On POD#1. She was ambulating, tolerating PO, and pain was controlled. She was discharged on this day in stable condition and normal vital signs. The patient is to have close follow-up with [facility] clinic vs. Dr. Gu 31 y/o  @14w 4d by LMP sent in for evaluation of suspected cornual ectopic after a failed medical termination and x2 attempt outpatient D&Cs. 3D TVUS imaging obtained with likely left-sided interstitial vs. cornual ectopic. Patient underwent laproscopic left-sided cornual wedge resection and left-sided salpingectomy on HD#2. Please see operative note for further details. On POD#0, pt was advanced to a regular diet, Mcdaniel was discontinued and patient was able to void spontaneously. On POD#1. She was ambulating, tolerating PO, and pain was controlled. She was discharged on this day in stable condition and normal vital signs. The patient is to have close follow-up with [facility] clinic vs. Dr. Gu

## 2023-08-16 NOTE — DISCHARGE NOTE PROVIDER - NPI NUMBER (FOR SYSADMIN USE ONLY) :
[[id number]] [UNKNOWN],[[id number]] [UNKNOWN],[[id number]] [UNKNOWN],[[id number]] [[id number]],[UNKNOWN] [[id number]],[UNKNOWN] [[id number]],[UNKNOWN]

## 2023-08-16 NOTE — DISCHARGE NOTE PROVIDER - CARE PROVIDER_API CALL
[facility],   [address] [place] [address]   [address]  [place], [place] [id number] [phone]  Phone: (   )    -  Fax: (   )    -  Follow Up Time: 2 weeks   [facility],   [address] [place] [address]   [address]  [place], [place] [id number] [phone]  Phone: (   )    -  Fax: (   )    -  Follow Up Time: 2 weeks   [facility],   [address] [place] [address]   [address]  [place], [place] [id number] [phone]  Phone: (   )    -  Fax: (   )    -  Follow Up Time: 2 weeks   [facility],   [address] [facility]   [address]  [place] [address]   [phone]  Phone: (   )    -  Fax: (   )    -  Follow Up Time: 2 weeks    [facility]  [address] [place] [address], [address]  [place] [address]  Phone: [phone]  Fax: [phone]  Follow Up Time:    [facility],   [address] [facility]   [address]  [place] [address]   [phone]  Phone: (   )    -  Fax: (   )    -  Follow Up Time: 2 weeks    [facility]  [address] [place] [address], [address]  [place] [address]  Phone: [phone]  Fax: [phone]  Follow Up Time:    [facility],   [address] [facility]   [address]  [place] [address]   [phone]  Phone: (   )    -  Fax: (   )    -  Follow Up Time: 2 weeks    [facility]  [address] [place] [address], [address]  [place] [address]  Phone: [phone]  Fax: [phone]  Follow Up Time:    [facility]  [address] [place] [address], [address]  [place] [address]  Phone: [phone]  Fax: [phone]  Follow Up Time:     [facility],   [address] [place] [address]   [address]  [place] [address]   [phone]  Phone: (   )    -  Fax: (   )    -  Follow Up Time: 2 weeks   [facility]  [address] [place] [address], [address]  [place] [address]  Phone: [phone]  Fax: [phone]  Follow Up Time:     [facility],   [address] [place] [address]   [address]  [place] [address]   [phone]  Phone: (   )    -  Fax: (   )    -  Follow Up Time: 2 weeks   [facility]  [address] [place] [address], [address]  [place] [address]  Phone: [phone]  Fax: [phone]  Follow Up Time:     [facility],   [address] [place] [address]   [address]  [place] [address]   [phone]  Phone: (   )    -  Fax: (   )    -  Follow Up Time: 2 weeks

## 2023-08-16 NOTE — DISCHARGE NOTE PROVIDER - NSDCCPCAREPLAN_GEN_ALL_CORE_FT
PRINCIPAL DISCHARGE DIAGNOSIS  Diagnosis: Pregnancy, ectopic, cornual or cervical  Assessment and Plan of Treatment:

## 2023-08-16 NOTE — DISCHARGE NOTE PROVIDER - PROVIDER TOKENS
FREE:[LAST:[[facility]],PHONE:[(   )    -],FAX:[(   )    -],ADDRESS:[[address] [place] [address]   [phone]],FOLLOWUP:[2 weeks]] FREE:[LAST:[[facility]],PHONE:[(   )    -],FAX:[(   )    -],ADDRESS:[[address] [place] [address]   [phone]],FOLLOWUP:[2 weeks]] FREE:[LAST:[[facility]],PHONE:[(   )    -],FAX:[(   )    -],ADDRESS:[[address] [place] [address]   [phone]],FOLLOWUP:[2 weeks]] FREE:[LAST:[[facility]],PHONE:[(   )    -],FAX:[(   )    -],ADDRESS:[[address] [place] [address]   [phone]],FOLLOWUP:[2 weeks]],PROVIDER:[TOKEN:[[id number]]] FREE:[LAST:[[facility]],PHONE:[(   )    -],FAX:[(   )    -],ADDRESS:[[address] [place] [address]   [phone]],FOLLOWUP:[2 weeks]],PROVIDER:[TOKEN:[[id number]]] FREE:[LAST:[[facility]],PHONE:[(   )    -],FAX:[(   )    -],ADDRESS:[[address] [place] [address]   [phone]],FOLLOWUP:[2 weeks]],PROVIDER:[TOKEN:[[id number]]] PROVIDER:[TOKEN:[[id number]]],FREE:[LAST:[[facility]],PHONE:[(   )    -],FAX:[(   )    -],ADDRESS:[[address] [place] [address] [phone]],FOLLOWUP:[2 weeks]] PROVIDER:[TOKEN:[[id number]]],FREE:[LAST:[[facility]],PHONE:[(   )    -],FAX:[(   )    -],ADDRESS:[[address] [place] [address] [phone]],FOLLOWUP:[2 weeks]] PROVIDER:[TOKEN:[[id number]]],FREE:[LAST:[[facility]],PHONE:[(   )    -],FAX:[(   )    -],ADDRESS:[[address] [place] [address] [phone]],FOLLOWUP:[2 weeks]]

## 2023-08-16 NOTE — DISCHARGE NOTE PROVIDER - NSDCCPTREATMENT_GEN_ALL_CORE_FT
PRINCIPAL PROCEDURE  Procedure: Treatment, ectopic pregnancy, laparoscopic  Findings and Treatment:      PRINCIPAL PROCEDURE  Procedure: Resection of cornu of fallopian tube  Findings and Treatment:       SECONDARY PROCEDURE  Procedure: Left salpingectomy  Findings and Treatment:

## 2023-08-16 NOTE — BRIEF OPERATIVE NOTE - COMMENTS
Dictation#  Vistaseal and Interceed placed Dictation#[id number]  Vistaseal and Interceed placed Dictation#[id number]  Vistaseal and Interceed placed Dictation#[id number]  Vistaseal and Interceed placed

## 2023-08-16 NOTE — CHART NOTE - NSCHARTNOTEFT_GEN_A_CORE
R3 OBGYN POST-OP CHECK    S: Patient seen and evaluated at bedside.  Pt awake and alert resting comfortably in bed.  Patient reports pain controlled with analgesia. Pt denies N/V, SOB, CP, palpitations, fever/chills. Tolerating clears.  Not OOB yet.    O:   T(C): 36.8 (08-16-23 @ 16:00), Max: 36.8 (08-16-23 @ 16:00)  HR: 74 (08-16-23 @ 16:00) (74 - 90)  BP: 122/79 (08-16-23 @ 16:00) (122/79 - 150/70)  RR: 16 (08-16-23 @ 16:00) (16 - 18)  SpO2: 99% (08-16-23 @ 16:00) (95% - 100%)  Wt(kg): --  I&O's Summary      Gen: Resting comfortably in bed, NAD  CV: S1S2, RRR  Lungs: CTA B/L  Abd: soft, appropriately tender, occasional BS x 4 quadrants.    Inc: 4 LSC incisions clean/dry/intact w/ steri strips  Ext: SCD's in place and functional, non-tender b/l, no edema        A/P: 32y Female s/p laparoscopic left cornual wedge resection and left salpingectomy. Patient doing well post-operatively.    Neuro: PO Analgesia PRN  CV: Hemodynamically stable.  Monitor VS.  Pulm: Saturating well on room air.  Encourage OOB and incentive spirometer use.   GI: Advance to regular diet. Anti-emetics PRN.  : DTV by 10p  FEN: Electrolytes: SLIV  Heme: DVT ppx w/ SCD's while in bed. Early ambulation, initially with assistance then as tolerated.   ID: Afebrile  Endo: No active issues   Dispo: Discharge planning     WILY Solorio, PGY3

## 2023-08-16 NOTE — BRIEF OPERATIVE NOTE - OPERATION/FINDINGS
EUA: grossly normal external genitalia.  Lsc: Entry site atraumatic. Upper abdomen grossly normal. Mesocolon adherent to fundus of uterus. Approx 10cm bulging area of L cornua with blanched appearance. Normal R cornua and adnexa. L tube and ovary normal. No other evidence of disease.

## 2023-08-16 NOTE — DISCHARGE NOTE PROVIDER - CARE PROVIDERS DIRECT ADDRESSES
,DirectAddress_Unknown ,DirectAddress_Unknown,[email or web address] ,DirectAddress_Unknown,[email or web address] ,[email or web address],[email or web address] ,[email or web address],[email or web address] ,[email or web address],DirectAddress_Unknown ,[email or web address],DirectAddress_Unknown

## 2023-08-16 NOTE — DISCHARGE NOTE PROVIDER - NSDCFUADDAPPT_GEN_ALL_CORE_FT
Please follow-up with Dr. Gu in x2 weeks or with the [facility] booking clinic  Please follow-up with Dr. Gu in x2 weeks or with the [facility] booking clinic  Please follow-up with Dr. Gu in x2 weeks or with the [facility] booking clinic

## 2023-08-17 ENCOUNTER — TRANSCRIPTION ENCOUNTER (OUTPATIENT)
Age: 33
End: 2023-08-17

## 2023-08-17 VITALS
RESPIRATION RATE: 18 BRPM | OXYGEN SATURATION: 100 % | TEMPERATURE: 98 F | DIASTOLIC BLOOD PRESSURE: 78 MMHG | SYSTOLIC BLOOD PRESSURE: 132 MMHG | HEART RATE: 66 BPM

## 2023-08-17 PROCEDURE — 85385 FIBRINOGEN ANTIGEN: CPT

## 2023-08-17 PROCEDURE — 76817 TRANSVAGINAL US OBSTETRIC: CPT

## 2023-08-17 PROCEDURE — C9399: CPT

## 2023-08-17 PROCEDURE — 84702 CHORIONIC GONADOTROPIN TEST: CPT

## 2023-08-17 PROCEDURE — 76377 3D RENDER W/INTRP POSTPROCES: CPT

## 2023-08-17 PROCEDURE — C1889: CPT

## 2023-08-17 PROCEDURE — 80053 COMPREHEN METABOLIC PANEL: CPT

## 2023-08-17 PROCEDURE — 88341 IMHCHEM/IMCYTCHM EA ADD ANTB: CPT

## 2023-08-17 PROCEDURE — 86850 RBC ANTIBODY SCREEN: CPT

## 2023-08-17 PROCEDURE — 85025 COMPLETE CBC W/AUTO DIFF WBC: CPT

## 2023-08-17 PROCEDURE — 96374 THER/PROPH/DIAG INJ IV PUSH: CPT

## 2023-08-17 PROCEDURE — 86901 BLOOD TYPING SEROLOGIC RH(D): CPT

## 2023-08-17 PROCEDURE — 85730 THROMBOPLASTIN TIME PARTIAL: CPT

## 2023-08-17 PROCEDURE — 88305 TISSUE EXAM BY PATHOLOGIST: CPT

## 2023-08-17 PROCEDURE — 99285 EMERGENCY DEPT VISIT HI MDM: CPT | Mod: 25

## 2023-08-17 PROCEDURE — 36415 COLL VENOUS BLD VENIPUNCTURE: CPT

## 2023-08-17 PROCEDURE — C1765: CPT

## 2023-08-17 PROCEDURE — 85384 FIBRINOGEN ACTIVITY: CPT

## 2023-08-17 PROCEDURE — 85610 PROTHROMBIN TIME: CPT

## 2023-08-17 PROCEDURE — 86900 BLOOD TYPING SEROLOGIC ABO: CPT

## 2023-08-17 RX ADMIN — SIMETHICONE 80 MILLIGRAM(S): 80 TABLET, CHEWABLE ORAL at 12:56

## 2023-08-17 RX ADMIN — Medication 975 MILLIGRAM(S): at 09:45

## 2023-08-17 RX ADMIN — Medication 600 MILLIGRAM(S): at 13:24

## 2023-08-17 RX ADMIN — Medication 600 MILLIGRAM(S): at 05:53

## 2023-08-17 RX ADMIN — Medication 975 MILLIGRAM(S): at 02:39

## 2023-08-17 RX ADMIN — Medication 600 MILLIGRAM(S): at 00:20

## 2023-08-17 RX ADMIN — Medication 600 MILLIGRAM(S): at 12:54

## 2023-08-17 RX ADMIN — Medication 975 MILLIGRAM(S): at 09:15

## 2023-08-17 NOTE — PROGRESS NOTE ADULT - SUBJECTIVE AND OBJECTIVE BOX
Gyn Progress Note HD#2    Subjective:   Pt seen and examined at bedside. No events overnight. Patient w/o complaints of bleeding or pain. Tolerating a regular diet being being made NPO. Pt denies fever, chills, chest pain, SOB, nausea, vomiting, lightheadedness, dizziness.      Objective:  T(F): 98.2 (08-16-23 @ 05:45), Max: 98.5 (08-15-23 @ 21:00)  HR: 68 (08-16-23 @ 05:45) (68 - 100)  BP: 105/58 (08-16-23 @ 05:45) (100/60 - 131/76)  RR: 18 (08-16-23 @ 05:45) (15 - 18)  SpO2: 99% (08-16-23 @ 05:45) (97% - 99%)  Wt(kg): --  I&O's Summary    CAPILLARY BLOOD GLUCOSE          MEDICATIONS  (STANDING):  lactated ringers. 1000 milliLiter(s) (75 mL/Hr) IV Continuous <Continuous>    MEDICATIONS  (PRN):  acetaminophen     Tablet .. 975 milliGRAM(s) Oral every 6 hours PRN Mild Pain (1 - 3)  ibuprofen  Tablet. 600 milliGRAM(s) Oral every 6 hours PRN Moderate Pain (4 - 6)      Physical Exam:  Constitutional: NAD, Awake, Walking around room after shower prior to eval   Lungs: Unlabored breathing. No respiratory distress  Abdomen: Soft. Non-tender. Non-distended   Extremities: No calf tenderness bilaterally    LABS:  08-15    137    |  101    |  13     ----------------------------<  65<L>  3.7     |  21<L>  |  0.95     Ca    9.6        15 Aug 2023 15:06    TPro  7.9    /  Alb  4.4    /  TBili  0.7    /  DBili  x      /  AST  17     /  ALT  7<L>   /  AlkPhos  67     08-15        PT/INR - ( 15 Aug 2023 15:06 )   PT: 13.4 sec;   INR: 1.23 ratio         PTT - ( 15 Aug 2023 15:06 )  PTT:31.7 sec  Urinalysis Basic - ( 15 Aug 2023 15:06 )    Color: x / Appearance: x / SG: x / pH: x  Gluc: 65 mg/dL / Ketone: x  / Bili: x / Urobili: x   Blood: x / Protein: x / Nitrite: x   Leuk Esterase: x / RBC: x / WBC x   Sq Epi: x / Non Sq Epi: x / Bacteria: x                  
Gyn Progress Note POD#1  HD#3    Subjective:   Pt seen and examined at bedside. Had chest pressure overnight that resolved w/ ICS, flatus, belching, simethicone, and ambulating. Pain controlled. Tolerating liquids. Pt denies fever, chills, SOB, nausea, vomiting, lightheadedness, dizziness. Voiding spontaneously     Objective:  T(F): 98 (08-17-23 @ 05:49), Max: 98.6 (08-16-23 @ 09:07)  HR: 60 (08-17-23 @ 05:49) (60 - 90)  BP: 124/79 (08-17-23 @ 05:49) (107/69 - 150/70)  RR: 18 (08-17-23 @ 05:49) (16 - 18)  SpO2: 99% (08-17-23 @ 05:49) (95% - 100%)  Wt(kg): --  I&O's Summary    16 Aug 2023 07:01  -  17 Aug 2023 06:25  --------------------------------------------------------  IN: 0 mL / OUT: 700 mL / NET: -700 mL      CAPILLARY BLOOD GLUCOSE          MEDICATIONS  (STANDING):  acetaminophen     Tablet .. 975 milliGRAM(s) Oral every 6 hours  heparin   Injectable 5000 Unit(s) SubCutaneous every 12 hours  ibuprofen  Tablet. 600 milliGRAM(s) Oral every 6 hours  lactated ringers. 1000 milliLiter(s) (75 mL/Hr) IV Continuous <Continuous>    MEDICATIONS  (PRN):  oxyCODONE    IR 5 milliGRAM(s) Oral every 6 hours PRN Severe Pain (7 - 10)  simethicone 80 milliGRAM(s) Chew every 6 hours PRN Gas      Physical Exam:  Constitutional: NAD, Lying in bed and sleeping comfortably prior to eval  CV: Regular rate and rhythm. No murmurs appreciated   Lungs: Clear to auscultation  bilaterally. No respiratory distress  Abdomen: Soft. Non-tender. Non-distended  Incision: Lsc incision sites w/ steri-strips that are c/d/i  Extremities: No calf tenderness bilaterally    LABS:            PT/INR - ( 16 Aug 2023 06:31 )   PT: 12.4 sec;   INR: 1.13 ratio         PTT - ( 16 Aug 2023 06:31 )  PTT:29.4 sec  Urinalysis Basic - ( 15 Aug 2023 15:06 )    Color: x / Appearance: x / SG: x / pH: x  Gluc: 65 mg/dL / Ketone: x  / Bili: x / Urobili: x   Blood: x / Protein: x / Nitrite: x   Leuk Esterase: x / RBC: x / WBC x   Sq Epi: x / Non Sq Epi: x / Bacteria: x

## 2023-08-17 NOTE — DISCHARGE NOTE NURSING/CASE MANAGEMENT/SOCIAL WORK - NSDCPEFALRISK_GEN_ALL_CORE
For information on Fall & Injury Prevention, visit: [email or web address] OR  [email or web address]-[email or web address]-injury OR  https://www.cdc.gov/steadi/patient.html For information on Fall & Injury Prevention, visit: [email or web address]/[email or web address]-[email or web address]-mobility OR  [email or web address]://[email or web address].[email or web address]/[email or web address]-prevention-tips-[email or web address]-avoid-injury OR  https://www.cdc.gov/steadi/patient.html For information on Fall & Injury Prevention, visit: [email or web address]://www.[email or web address]/news/fall-[email or web address]-maintains-health-[email or web address]-mobility OR  [email or web address]://www.[email or web address]/[email or web address]-[email or web address]-tips-to-avoid-injury OR  https://www.cdc.gov/steadi/patient.html

## 2023-08-17 NOTE — DISCHARGE NOTE NURSING/CASE MANAGEMENT/SOCIAL WORK - PATIENT PORTAL LINK FT
You can access the FollowMyHealth Patient Portal offered by [facility] by registering at the following website: [email or web address]. By joining [employer]’s FollowMyHealth portal, you will also be able to view your health information using other applications (apps) compatible with our system. You can access the FollowMyHealth Patient Portal offered by [facility] by registering at the following website: [email or web address]. By joining [employer]’s FollowMyHealth portal, you will also be able to view your health information using other applications (apps) compatible with our system. You can access the FollowMyHealth Patient Portal offered by [facility] by registering at the following website: [email or web address]. By joining [employer]’s FollowMyHealth portal, you will also be able to view your health information using other applications (apps) compatible with our system.

## 2023-08-17 NOTE — DISCHARGE NOTE NURSING/CASE MANAGEMENT/SOCIAL WORK - NSDCFUADDAPPT_GEN_ALL_CORE_FT
Please follow-up with Dr. Gu in x2 weeks or with the [facility] booking clinic  Please follow-up with Dr. Gu in x2 weeks or with the [facility] booking clinic  Please follow-up with Dr. Gu in x2 weeks or with the [facility] booking clinic

## 2023-08-17 NOTE — PROVIDER CONTACT NOTE (CHANGE IN STATUS NOTIFICATION) - ACTION/TREATMENT ORDERED:
pt called to inform that + belching and flatus relieved chest pressure,  ordered Mylicon q6h PRN, enc amb w/ asst to facilitate peristalsis.

## 2023-08-17 NOTE — PROGRESS NOTE ADULT - ASSESSMENT
Assessment/Plan: 32y POD#1 s/p lsc left cornual wedge resection and left salpingectomy. Patient is progressing appropriately post-op     Neuro: Acetaminophen and Ibuprofen PRN. Oxycodone 5mg IR PRN  CV: Hemodynamically stable  Pulm: Saturating well on RA. Encourage continuing incentive spirometry.  GI: Regular  : Voiding Spontaneously  Heme: HSQ for DVT ppx  FEN: LR@75. Can SLIV   ID: Afebrile  Endo: No active issues   Dispo: Discharge planning     Yunior Armstrong, PGY-2
Assessment/Plan: 32y HD#1 who was admitted for surgical management of a failed medical  with recent imaging findings yesterday demonstrating a likely interstitial versus cornual ectopic. Patient without pain, bleeding, or any other complaints. Patient hemodynamically stable and is on the add-on schedule.     Neuro: Pain meds PRN  CV: Hemodynamically stable  - Ordered for AM CBC   Pulm: Saturating well on RA  GI: NPO  : Voiding Spontaneously  Heme: SCDs. No HSQ   FEN: LR@75  ID: Afebrile  Endo: No active issues   Dispo: For surgery today  - Patient notably became tearful when reviewing demise paperwork. Examiner provided reassurance and reiterated that this is a non-viable pregnancy     Yunior Armstrong, PGY-2  Ob/Gyn

## 2023-08-17 NOTE — DISCHARGE NOTE NURSING/CASE MANAGEMENT/SOCIAL WORK - NSDCPETBCESMAN_GEN_ALL_CORE
If you are a smoker, it is important for your health to stop smoking. Please be aware that second hand smoke is also harmful. pt with BPH with urinary retention and instrumentation, fevers and +UA, with recent treatment for UTI  last cx grew pan sensitive E coli - will treat with Levaquin to also cover possible respiratory rosalva   f/u blood and urine cultures, tailor abx appropriately   trend fever curve   maintenance IVF for now

## 2023-08-18 LAB
FIBRINOGEN AG PPP IA-MCNC: 442 MG/DL — SIGNIFICANT CHANGE UP (ref 206–478)

## 2023-08-26 ENCOUNTER — APPOINTMENT (OUTPATIENT)
Dept: OBGYN | Facility: CLINIC | Age: 33
End: 2023-08-26

## 2023-09-12 LAB
SURGICAL PATHOLOGY STUDY: SIGNIFICANT CHANGE UP

## 2023-09-15 NOTE — CHART NOTE - NSCHARTNOTEFT_GEN_A_CORE
R4 Chart Note     Called patient at number provided on chart to review pathology however no answer. Unable to leave voicemail     Pathology : Left fallopian tube (salpingectomy):  -   Fallopian tube with detached fragments of necrotic, hydropic  chorionic villi,  consistent with ectopic (tubal) pregnancy.     No evidence of complete hydatidiform mole       Tahira Flores, PGY4 R4 Chart Note     Called patient at number provided on chart to review pathology however no answer. Unable to leave voicemail     Pathology : Left fallopian tube (salpingectomy):  -   Fallopian tube with detached fragments of necrotic, hydropic  chorionic villi,  consistent with ectopic (tubal) pregnancy.     No evidence of complete hydatidiform mole       Tahiar Flores, PGY4

## 2023-09-18 NOTE — CHART NOTE - NSCHARTNOTEFT_GEN_A_CORE
Called patient at number provided on chart. Reviewed pathology with patient, including no evidence of molar pregnancy. Patient expressed and verbalized understanding.     Pathology : Left fallopian tube (salpingectomy):  -   Fallopian tube with detached fragments of necrotic, hydropic  chorionic villi,  consistent with ectopic (tubal) pregnancy.     No evidence of complete hydatidiform mole       Patient has not yet had a chance to establish care with OBGYN post-operatively. Her insurance is not accepted at our clinic.     [facility] [phone] to establish care.      Cathleen Lyman, PGY2 Called patient at number provided on chart. Reviewed pathology with patient, including no evidence of molar pregnancy. Patient expressed and verbalized understanding.     Pathology : Left fallopian tube (salpingectomy):  -   Fallopian tube with detached fragments of necrotic, hydropic  chorionic villi,  consistent with ectopic (tubal) pregnancy.     No evidence of complete hydatidiform mole       Patient has not yet had a chance to establish care with OBGYN post-operatively. Her insurance is not accepted at our clinic.     [facility] [phone] to establish care.      Cathleen Lyman, PGY2 Called patient at number provided on chart. Reviewed pathology with patient, including no evidence of molar pregnancy. Patient expressed and verbalized understanding.     Pathology : Left fallopian tube (salpingectomy):  -   Fallopian tube with detached fragments of necrotic, hydropic  chorionic villi,  consistent with ectopic (tubal) pregnancy.     No evidence of complete hydatidiform mole       Patient has not yet had a chance to establish care with OBGYN post-operatively. Her insurance is not accepted at our clinic.     [facility] [phone] to establish care.      Cathleen Lyman, PGY2

## 2023-12-06 NOTE — H&P ADULT - NSVTERISKREFERASSESS_GEN_ALL_CORE
SURGERY SCHEDULING REQUIREMENTS INCLUDE:  Facility: [facility]  Admission Type: Day Surgery   Time Needed: 15 minutes  Anesthesia: Local  If MAC: Preop with PCP required for SCS Perm implants only? No  MRSA SWAB required? No  NPO: No   Procedure:   R IA hip injection  Dx/CPT CODE: 04680, M16.11  Anticoagulation:   Is the patient on Coumadin/Warfarin, Lovenox, Plavix, or Aspirin/Excedrin? Yes,  Hold IBUPROFEN for 24 hours prior to procedure and 24 hours following.   Denies taking Plavix  OR NSAIDS (OTC products)? - Hold for 24 hours  Does patient take any GLP-1 inhibitors/agonists (Ozempic, Trulicity, Victoza, etc)? No  If yes, need to hold for 7 days prior to any procedures with sedation. Patients need to stop all solids 24 hours prior to surgery, may have clear liquids until 8 hours prior to surgery, then NPO.  INR Check: No  Platelets WNL?   PLT (K/mcL)   Date Value   01/31/2023 232         Sleep Apnea: Yes  Latex Allergy: No  Diabetic: Yes Insulin pump or glucose monitor? No  Pacemaker: No  Heart/Lung issues: HTN, PVD, COPD (if yes need to verify approval for anesthesia with MD)  Stroke/MI in the past 6 months: No  Estimated body mass index is 50.59 kg/m² as calculated from the following:    Height as of an earlier encounter on 12/6/23: 5' (1.524 m).    Weight as of an earlier encounter on 12/6/23: 117.5 kg (259 lb 0.7 oz).  Special Instructions: Under Fluoroscopy     Follow up: Shauna      Refer to the Assessment tab to view/cancel completed assessment.

## 2023-12-23 RX ORDER — ACETAMINOPHEN 500 MG
3 TABLET ORAL
Qty: 0 | Refills: 0 | DISCHARGE

## 2023-12-23 RX ORDER — IBUPROFEN 200 MG
1 TABLET ORAL
Qty: 0 | Refills: 0 | DISCHARGE

## 2024-08-04 PROBLEM — Z33.2 MEDICAL ABORTION: Status: RESOLVED | Noted: 2023-08-14 | Resolved: 2024-08-04

## 2024-08-19 ENCOUNTER — INPATIENT (INPATIENT)
Facility: HOSPITAL | Age: 34
LOS: 2 days | Discharge: HOME | End: 2024-08-22
Attending: OBSTETRICS & GYNECOLOGY | Admitting: OBSTETRICS & GYNECOLOGY
Payer: MEDICAID

## 2024-08-19 ENCOUNTER — TRANSCRIPTION ENCOUNTER (OUTPATIENT)
Age: 34
End: 2024-08-19

## 2024-08-19 VITALS
TEMPERATURE: 97 F | OXYGEN SATURATION: 99 % | DIASTOLIC BLOOD PRESSURE: 70 MMHG | SYSTOLIC BLOOD PRESSURE: 111 MMHG | HEART RATE: 96 BPM | RESPIRATION RATE: 16 BRPM

## 2024-08-19 DIAGNOSIS — Z3A.34 34 WEEKS GESTATION OF PREGNANCY: ICD-10-CM

## 2024-08-19 DIAGNOSIS — Z98.891 HISTORY OF UTERINE SCAR FROM PREVIOUS SURGERY: Chronic | ICD-10-CM

## 2024-08-19 DIAGNOSIS — Z98.891 HISTORY OF UTERINE SCAR FROM PREVIOUS SURGERY: ICD-10-CM

## 2024-08-19 DIAGNOSIS — O09.893 SUPERVISION OF OTHER HIGH RISK PREGNANCIES, THIRD TRIMESTER: ICD-10-CM

## 2024-08-19 DIAGNOSIS — O26.899 OTHER SPECIFIED PREGNANCY RELATED CONDITIONS, UNSPECIFIED TRIMESTER: ICD-10-CM

## 2024-08-19 DIAGNOSIS — Z34.80 ENCOUNTER FOR SUPERVISION OF OTHER NORMAL PREGNANCY, UNSPECIFIED TRIMESTER: ICD-10-CM

## 2024-08-19 DIAGNOSIS — Z90.79 ACQUIRED ABSENCE OF OTHER GENITAL ORGAN(S): Chronic | ICD-10-CM
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
## 2024-08-19 DIAGNOSIS — Z98.890 OTHER SPECIFIED POSTPROCEDURAL STATES: Chronic | ICD-10-CM

## 2024-08-19 PROBLEM — D64.9 ANEMIA, UNSPECIFIED: Chronic | Status: ACTIVE | Noted: 2023-08-14

## 2024-08-19 LAB
ALBUMIN SERPL ELPH-MCNC: 2.7 G/DL — LOW (ref 3.5–5)
ALP SERPL-CCNC: 194 U/L — HIGH (ref 40–120)
ALT FLD-CCNC: 18 U/L DA — SIGNIFICANT CHANGE UP (ref 10–60)
ANION GAP SERPL CALC-SCNC: 6 MMOL/L — SIGNIFICANT CHANGE UP (ref 5–17)
APPEARANCE UR: ABNORMAL
APTT BLD: 25.1 SEC — SIGNIFICANT CHANGE UP (ref 24.5–35.6)
AST SERPL-CCNC: 18 U/L — SIGNIFICANT CHANGE UP (ref 10–40)
BASOPHILS # BLD AUTO: 0.03 K/UL — SIGNIFICANT CHANGE UP (ref 0–0.2)
BASOPHILS NFR BLD AUTO: 0.2 % — SIGNIFICANT CHANGE UP (ref 0–2)
BILIRUB SERPL-MCNC: 0.3 MG/DL — SIGNIFICANT CHANGE UP (ref 0.2–1.2)
BILIRUB UR-MCNC: NEGATIVE — SIGNIFICANT CHANGE UP
BLD GP AB SCN SERPL QL: SIGNIFICANT CHANGE UP
BUN SERPL-MCNC: 6 MG/DL — LOW (ref 7–18)
CALCIUM SERPL-MCNC: 9.3 MG/DL — SIGNIFICANT CHANGE UP (ref 8.4–10.5)
CHLORIDE SERPL-SCNC: 106 MMOL/L — SIGNIFICANT CHANGE UP (ref 96–108)
CO2 SERPL-SCNC: 24 MMOL/L — SIGNIFICANT CHANGE UP (ref 22–31)
COLOR SPEC: YELLOW — SIGNIFICANT CHANGE UP
CREAT SERPL-MCNC: 0.61 MG/DL — SIGNIFICANT CHANGE UP (ref 0.5–1.3)
DIFF PNL FLD: NEGATIVE — SIGNIFICANT CHANGE UP
EGFR: 122 ML/MIN/1.73M2 — SIGNIFICANT CHANGE UP
EOSINOPHIL # BLD AUTO: 0.09 K/UL — SIGNIFICANT CHANGE UP (ref 0–0.5)
EOSINOPHIL NFR BLD AUTO: 0.7 % — SIGNIFICANT CHANGE UP (ref 0–6)
FIBRINOGEN PPP-MCNC: 515 MG/DL — HIGH (ref 200–475)
GLUCOSE SERPL-MCNC: 86 MG/DL — SIGNIFICANT CHANGE UP (ref 70–99)
GLUCOSE UR QL: NEGATIVE MG/DL — SIGNIFICANT CHANGE UP
HCT VFR BLD CALC: 36.4 % — SIGNIFICANT CHANGE UP (ref 34.5–45)
HGB BLD-MCNC: 12.2 G/DL — SIGNIFICANT CHANGE UP (ref 11.5–15.5)
IMM GRANULOCYTES NFR BLD AUTO: 1.4 % — HIGH (ref 0–0.9)
INR BLD: 0.9 RATIO — SIGNIFICANT CHANGE UP (ref 0.85–1.18)
KETONES UR-MCNC: NEGATIVE MG/DL — SIGNIFICANT CHANGE UP
LDH SERPL L TO P-CCNC: 186 U/L — SIGNIFICANT CHANGE UP (ref 120–225)
LEUKOCYTE ESTERASE UR-ACNC: ABNORMAL
LYMPHOCYTES # BLD AUTO: 15.1 % — SIGNIFICANT CHANGE UP (ref 13–44)
LYMPHOCYTES # BLD AUTO: 2.04 K/UL — SIGNIFICANT CHANGE UP (ref 1–3.3)
MCHC RBC-ENTMCNC: 31 PG — SIGNIFICANT CHANGE UP (ref 27–34)
MCHC RBC-ENTMCNC: 33.5 GM/DL — SIGNIFICANT CHANGE UP (ref 32–36)
MCV RBC AUTO: 92.4 FL — SIGNIFICANT CHANGE UP (ref 80–100)
MONOCYTES # BLD AUTO: 0.94 K/UL — HIGH (ref 0–0.9)
MONOCYTES NFR BLD AUTO: 7 % — SIGNIFICANT CHANGE UP (ref 2–14)
NEUTROPHILS # BLD AUTO: 10.22 K/UL — HIGH (ref 1.8–7.4)
NEUTROPHILS NFR BLD AUTO: 75.6 % — SIGNIFICANT CHANGE UP (ref 43–77)
NITRITE UR-MCNC: NEGATIVE — SIGNIFICANT CHANGE UP
NRBC # BLD: 0 /100 WBCS — SIGNIFICANT CHANGE UP (ref 0–0)
PH UR: 6.5 — SIGNIFICANT CHANGE UP (ref 5–8)
PLATELET # BLD AUTO: 230 K/UL — SIGNIFICANT CHANGE UP (ref 150–400)
POTASSIUM SERPL-MCNC: 3.7 MMOL/L — SIGNIFICANT CHANGE UP (ref 3.5–5.3)
POTASSIUM SERPL-SCNC: 3.7 MMOL/L — SIGNIFICANT CHANGE UP (ref 3.5–5.3)
PROT SERPL-MCNC: 7 G/DL — SIGNIFICANT CHANGE UP (ref 6–8.3)
PROT UR-MCNC: NEGATIVE MG/DL — SIGNIFICANT CHANGE UP
PROTHROM AB SERPL-ACNC: 10.3 SEC — SIGNIFICANT CHANGE UP (ref 9.5–13)
RBC # BLD: 3.94 M/UL — SIGNIFICANT CHANGE UP (ref 3.8–5.2)
RBC # FLD: 13.6 % — SIGNIFICANT CHANGE UP (ref 10.3–14.5)
SODIUM SERPL-SCNC: 136 MMOL/L — SIGNIFICANT CHANGE UP (ref 135–145)
SP GR SPEC: 1.01 — SIGNIFICANT CHANGE UP (ref 1–1.03)
URATE SERPL-MCNC: 3 MG/DL — SIGNIFICANT CHANGE UP (ref 2.5–7)
UROBILINOGEN FLD QL: 0.2 MG/DL — SIGNIFICANT CHANGE UP (ref 0.2–1)
WBC # BLD: 13.51 K/UL — HIGH (ref 3.8–10.5)
WBC # FLD AUTO: 13.51 K/UL — HIGH (ref 3.8–10.5)

## 2024-08-19 PROCEDURE — 76819 FETAL BIOPHYS PROFIL W/O NST: CPT | Mod: 26

## 2024-08-19 RX ORDER — VITAMIN A, ASCORBIC ACID, VITAMIN D, .ALPHA.-TOCOPHEROL, THIAMINE MONONITRATE, RIBOFLAVIN, NIACIN, PYRIDOXINE HYDROCHLORIDE, FOLIC ACID, CYANOCOBALAMIN, CALCIUM, IRON, MAGNESIUM, ZINC, AND COPPER 2700; 70; 400; 30; 1.6; 1.8; 18; 2.5; 1; 12; 100; 65; 25; 25; 2 [IU]/1; MG/1; [IU]/1; [IU]/1; MG/1; MG/1; MG/1; MG/1; MG/1; UG/1; MG/1; MG/1; MG/1; MG/1; MG/1
1 TABLET ORAL DAILY
Refills: 0 | Status: DISCONTINUED | OUTPATIENT
Start: 2024-08-19 | End: 2024-08-20

## 2024-08-19 RX ORDER — NIFEDIPINE 60 MG/1
10 TABLET, FILM COATED, EXTENDED RELEASE ORAL ONCE
Refills: 0 | Status: DISCONTINUED | OUTPATIENT
Start: 2024-08-19 | End: 2024-08-19

## 2024-08-19 RX ORDER — FOLIC ACID 1 MG
1 TABLET ORAL DAILY
Refills: 0 | Status: DISCONTINUED | OUTPATIENT
Start: 2024-08-19 | End: 2024-08-20

## 2024-08-19 RX ORDER — SODIUM CHLORIDE 9 MG/ML
1000 INJECTION INTRAMUSCULAR; INTRAVENOUS; SUBCUTANEOUS ONCE
Refills: 0 | Status: DISCONTINUED | OUTPATIENT
Start: 2024-08-19 | End: 2024-08-19

## 2024-08-19 RX ORDER — ACETAMINOPHEN 325 MG/1
1000 TABLET ORAL ONCE
Refills: 0 | Status: COMPLETED | OUTPATIENT
Start: 2024-08-19 | End: 2024-08-19

## 2024-08-19 RX ORDER — FERROUS SULFATE 325(65) MG
325 TABLET ORAL DAILY
Refills: 0 | Status: DISCONTINUED | OUTPATIENT
Start: 2024-08-19 | End: 2024-08-20

## 2024-08-19 RX ADMIN — ACETAMINOPHEN 400 MILLIGRAM(S): 325 TABLET ORAL at 18:24

## 2024-08-19 RX ADMIN — Medication 125 MILLILITER(S): at 22:15

## 2024-08-19 RX ADMIN — Medication 1000 MILLILITER(S): at 19:27

## 2024-08-19 NOTE — OB PROVIDER TRIAGE NOTE - NSHPLABSRESULTS_GEN_ALL_CORE
< from: US Fetal Bio Profile w/o Non-Stress (08.19.24 @ 16:27) >    Findings:    Single intrauterine pregnancy in cephalic lie. Anterior placenta without   placenta previa or definite evidence of retroplacental hematoma. Normal   fetal tone, fetal movement, fetal breathing and amniotic fluid index of   13.9 corresponding to 8 out of 8 biophysical profile. Limited evaluation   of fetal anatomy demonstrates intact calvarium with fluid in the stomach   and bladder.  [<>]    Fetal biometry was not obtained.  Impression:    Single intrauterine pregnancy in cephalic lie with 8 out of 8 biophysical   profile. Anterior placenta without evidence of placenta previa or   retroplacental hematoma.    < end of copied text >
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
## 2024-08-19 NOTE — OB PROVIDER H&P - NSHPPHYSICALEXAM_GEN_ALL_CORE
Vital Signs Last 24 Hrs  T(C): 36.3 (19 Aug 2024 15:25), Max: 36.8 (19 Aug 2024 15:00)  T(F): 97.4 (19 Aug 2024 15:25), Max: 98.3 (19 Aug 2024 15:00)  HR: 96 (19 Aug 2024 15:25) (96 - 114)  BP: 111/70 (19 Aug 2024 15:25) (111/70 - 120/63)  BP(mean): --  RR: 16 (19 Aug 2024 15:25) (16 - 16)  SpO2: 99% (19 Aug 2024 15:25) (98% - 99%)    Parameters below as of 19 Aug 2024 15:25  Patient On (Oxygen Delivery Method): room air
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
## 2024-08-19 NOTE — OB PROVIDER TRIAGE NOTE - BIRTH SEX
Transplant Surgery Clinic Note    Date: 4/11/2019    Name: Lucy Chapin    Reason for Visit: IS Management, S/P lDKT    Date of Transplant: 3/12/2019 (Kidney)    HPI:  Lucy Chapin is a 67 year old female with a H/O ESRD due to HTN nephrosclerosis with secondary FSGS s/p S/P NKR LDKT 3/12/19 (with Thymogobulin induction). 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      Patient presented to [facility] ED 4/6/19 due to fever. ED work-up revealed elevated WBC (13.3). Patient pan cultured. ID and transplant Nephrology consulted. Patient admitted for further evaluation and management. Patient was pan cultured on admission and ID was consulted. Patient was started on empiric antibiotics. All cultures returned as negative and she was afebrile through her stay. She was discharged in stable condition. She has follow up arranged to see transplant surgery on 4/11/19.     Patient is here today feeling well. Her GERD is much improved. She is drinking 2-2.5L daily. Her urine output is steady. Her weight has come down a bit to her baseline. She has drainage from her incision still, but this has improved. She denies any fevers, chills, cough, SOB, chest pain, palpitations, abdominal pain, nausea, vomiting, constipation, or diarrhea.     PMH:  Past Medical History:   Diagnosis Date   • Anemia    • Carotid stenosis    • Cataracts, bilateral    • Coronary artery disease 10/2018    Minimal non-obstructive CAD   • CRF (chronic renal failure)    • Cutaneous lupus erythematosus     SD negative -    • Draining postoperative wound 03/2019    Right abdomen   • Fracture     Great toe - right   • Gastroesophageal reflux disease    • History of shingles ~ Fall, 2017    Right side from breast to back   • Hyperlipidemia    •  Lupus nephritis (CMS/HCC)    • Normal vaginal delivery     X 2   • Obesity    • Other chronic pain     Fibromyalgia   • Pneumonia    • PONV (postoperative nausea and vomiting)    • RAD (reactive airway disease)    • S/P kidney transplant 03/2019   • Sigmoid diverticulosis 03/18/2019    per CT scan   • Sleep apnea     not using sleep apnea (Cpap machine)   • Transfusion history 03/2019    s/p transplant for low H/H   • Wears glasses        Medications:  Current Outpatient Medications   Medication Sig   • magnesium lactate (MAG-TAB SR) 84 MG (7MEQ) Tab CR Take 2 tablets by mouth daily (at noon).   • K Phos [place]-Sod Phos Di & Mono (VIRT-PHOS 250 NEUTRAL) 155-852-130 MG Tab Take 1 tablet by mouth 2 times daily.   • mycophenolate (CELLCEPT) 250 MG capsule Take 1 capsule by mouth 2 times daily. Total daily dose 750 mg twice daily   • ferrous sulfate 325 (65 FE) MG tablet Take 1 tablet by mouth 2 times daily.   • valGANciclovir (VALCYTE) 450 MG tablet Take 1 tablet by mouth daily.   • mycophenolate (CELLCEPT) 500 MG tablet Take 1 tablet by mouth 2 times daily. Total dose of 750 mg twice daily   • ciprofloxacin (CIPRO) 250 MG tablet Take 1 tablet by mouth 2 times daily.   • dapsone 100 MG tablet Take 1 tablet by mouth daily.   • pantoprazole (PROTONIX) 40 MG tablet Take 1 tablet by mouth 2 times daily.   • predniSONE (DELTASONE) 5 MG tablet Take 1 tablet by mouth daily.   • tacrolimus (PROGRAF) 0.5 MG capsule Take up to 1 capsule by mouth 2 times a day (along with 1 mg caps) or as directed. (Patient taking differently: Take 0.5 mg by mouth every evening. Per MD instructions)   • tacrolimus (PROGRAF) 1 MG capsule Current dose 2 mg in am and 2.5 mg in pm or as directed   • gabapentin (NEURONTIN) 300 MG capsule Take 300 mg by mouth nightly.   • sucralfate (CARAFATE) 1 GM/10ML suspension Take 10 mLs by mouth 2 times daily. At 1200 and 4 pm   • aspirin 81 MG chewable tablet Chew 1 tablet by mouth daily.   • ondansetron (ZOFRAN)  4 MG tablet Take 1 tablet by mouth every 12 hours as needed for Nausea. (Patient taking differently: Take 4 mg by mouth every 12 hours. )   • montelukast (SINGULAIR) 10 MG tablet TAKE 1 TABLET BY MOUTH EVERY EVENING   • simvastatin (ZOCOR) 20 MG tablet TAKE 1 TABLET BY MOUTH NIGHTLY   • acetaminophen (TYLENOL) 500 MG tablet Take 1,000 mg by mouth as needed for Pain.   • SYMBICORT 80-4.5 MCG/ACT inhaler INHALE 2 PUFFS INTO THE LUNGS TWICE DAILY (Patient taking differently: INHALE 2 PUFFS INTO THE LUNGS TWICE DAILY PRN as needed)   • cholecalciferol (VITAMIN D3) 1000 UNITS tablet Take 1,000 Units by mouth nightly.      No current facility-administered medications for this visit.        Allergies:   ALLERGIES:   Allergen Reactions   • Keflex RASH   • Adhesive   (Environmental) Dermatitis   • Penicillins RASH   • Sulfa Drugs Cross Reactors RASH   • Contrast Media Other (See Comments)     3/8/2019, CT contrast given, odd sensation noted on R side of throat. Gone in less than 5 minutes. Per Radiologist unclear if it is a true allergy. Questionable if pre medication is needed.       Exam:  Vitals:   Vitals:    04/11/19 1321   BP:    Pulse:    Temp: 99.1 °F (37.3 °C)     General: Alert & Oriented X 3, appears well, NAD.   Abdomen: Round, soft, non-distended, non-tender to palpation. RLQ incision with superior serous drainage.     Data Reviewed:    Laboratory:  WBC (K/mcL)   Date Value   04/10/2019 5.0     RBC (mil/mcL)   Date Value   04/10/2019 2.76 (L)     HCT (%)   Date Value   04/10/2019 26.2 (L)     HGB (g/dL)   Date Value   04/10/2019 7.8 (L)     PLT (K/mcL)   Date Value   04/10/2019 426   09/18/2013 350     Sodium (mmol/L)   Date Value   04/10/2019 144     Potassium (mmol/L)   Date Value   04/10/2019 4.0     Chloride (mmol/L)   Date Value   04/10/2019 110 (H)     Glucose (mg/dL)   Date Value   04/10/2019 84     CALCIUM (mg/dL)   Date Value   04/10/2019 9.5     Carbon Dioxide (mmol/L)   Date Value   04/10/2019 24      BUN (mg/dL)   Date Value   04/10/2019 9     Creatinine (mg/dL)   Date Value   04/10/2019 1.25 (H)     Assessment/Plan:   1. H/O ESRD due to HTN nephrosclerosis with secondary FSGS S/P LDKT 3/12/19 (with low dose Thymoglobulin induction). POD# 30.  -- SCr stable at 1.2.   -- Continue to drink 2-3L fluid.   -- Remainder of suture removed, anticipate wound to potentially open slightly. Patient ot call if she experiences an open wound.   -- Ureteral stent removed 4/4/19.   -- Continue ASA.  2. Prophylactic Immunosuppression.   -- Tacrolimus level 10.5. Prograf 2mg Q AM/2.5mg Q PM. Trough goal 10-12 ng/mL.   -- Cellcept 750mg BID.   -- Prednisone 5 mg daily indefinitely.  3. Prophylaxis.   -- Fungal. Mycelex 10 mg QID x 1 month (end date 4/12/19).  -- PCP. On Dapsone 100 mg QD x 6 months (end date 9/12/19).   -- UTI. On Cipro 250 mg PO QD x 3 months (end date 6/12/19).   -- CMV (D+/R-). Valcyte 450 mg daily (renally dosed) x 6 months (end date 9/12/19).  -- GI / GERD.  Protonix 40 mg BID.  5. GERD. Protonix 40mg BID and Carafate. Following with GI. Symptoms greatly improved.   6. Electrolyte abnormalities.   -- Hypophosphatemia. On Virt-phos QID. Decrease to BID dosing given improved phos level.   -- Hypomagnesemia. On magnesium lactate daily. Increase dosing to twice daily given mg 1.3.    Follow-up with labs 4/15, office visit in 1 week.     Lola Miguel PA-C  4/11/2019     Female
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
## 2024-08-19 NOTE — OB PROVIDER TRIAGE NOTE - NS_BABIESUTERO_OBGYN_ALL_OB_NU
"MRD1 1. 5BLF: 14 - lagTBUT: 15 seconds""Ptosis/Bleph OU.-Ptosis (the upper eyelid being in a lower than normal position) of the upper eyelid was explained to the patient. -RBAs of ptosis repair discussed with patient. Treatment options include observation or surgical correction.-Due to affect of dermatochalasis on aesthetic outcome recommend pt have blepharoplasty during ptosis repair.-Will order ptosis VF and external photos and review results with patient.-Pt to dc Baby ASA 3 weeks in advance. Pt self started. Pt wishes to have done in OBX OR if possible. If insurance will not cover will have in [facility] office.   [place] to schedule.; 's Notes: MRDFE" 1

## 2024-08-19 NOTE — OB PROVIDER TRIAGE NOTE - NSHPPHYSICALEXAM_GEN_ALL_CORE
Vital Signs Last 24 Hrs  T(C): 36.3 (19 Aug 2024 15:25), Max: 36.8 (19 Aug 2024 15:00)  T(F): 97.4 (19 Aug 2024 15:25), Max: 98.3 (19 Aug 2024 15:00)  HR: 96 (19 Aug 2024 15:25) (96 - 114)  BP: 111/70 (19 Aug 2024 15:25) (111/70 - 120/63)  BP(mean): --  RR: 16 (19 Aug 2024 15:25) (16 - 16)  SpO2: 99% (19 Aug 2024 15:25) (98% - 99%)    Parameters below as of 19 Aug 2024 15:25  Patient On (Oxygen Delivery Method): room air    gen aox3, not in acute distress  abd gravid, soft, nontender, no guarding, no rebound  speculum os appears closed and long, no bleeding, no fluid, no discharge  sve closed, no blood on glove  nst baseline 140, moderate variability, variable decel with spontaneous recovery to baseline, accels  toco no contractions

## 2024-08-19 NOTE — OB RN TRIAGE NOTE - FALL HARM RISK - UNIVERSAL INTERVENTIONS
Bed in lowest position, wheels locked, appropriate side rails in place/Call bell, personal items and telephone in reach/Instruct patient to call for assistance before getting out of bed or chair/Non-slip footwear when patient is out of bed/[place] to call system/Physically safe environment - no spills, clutter or unnecessary equipment/Purposeful Proactive Rounding/Room/bathroom lighting operational, light cord in reach

## 2024-08-19 NOTE — OB PROVIDER H&P - NSHPLABSRESULTS_GEN_ALL_CORE
12.2   13.51 )-----------( 230      ( 19 Aug 2024 18:11 )             36.4         136  |  106  |  6<L>  ----------------------------<  86  3.7   |  24  |  0.61    Ca    9.3      19 Aug 2024 18:11    TPro  7.0  /  Alb  2.7<L>  /  TBili  0.3  /  DBili  x   /  AST  18  /  ALT  18  /  AlkPhos  194<H>        Urinalysis Basic - ( 19 Aug 2024 18:11 )    Color: Yellow / Appearance: Cloudy / S.008 / pH: x  Gluc: 86 mg/dL / Ketone: Negative mg/dL  / Bili: Negative / Urobili: 0.2 mg/dL   Blood: x / Protein: Negative mg/dL / Nitrite: Negative   Leuk Esterase: Moderate / RBC: 1 /HPF / WBC 18 /HPF   Sq Epi: x / Non Sq Epi: x / Bacteria: Occasional /HPF        Findings:    Single intrauterine pregnancy in cephalic lie. Anterior placenta without   placenta previa or definite evidence of retroplacental hematoma. Normal   fetal tone, fetal movement, fetal breathing and amniotic fluid index of   13.9 corresponding to 8 out of 8 biophysical profile. Limited evaluation   of fetal anatomy demonstrates intact calvarium with fluid in the stomach   and bladder.  [<>]    Fetal biometry was not obtained.  Impression:    Single intrauterine pregnancy in cephalic lie with 8 out of 8 biophysical   profile. Anterior placenta without evidence of placenta previa or   retroplacental hematoma.
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
## 2024-08-20 PROBLEM — Z78.9 OTHER SPECIFIED HEALTH STATUS: Chronic | Status: INACTIVE | Noted: 2023-08-05 | Resolved: 2024-08-19

## 2024-08-20 LAB
ALBUMIN SERPL ELPH-MCNC: 2.6 G/DL — LOW (ref 3.5–5)
ALP SERPL-CCNC: 214 U/L — HIGH (ref 40–120)
ALT FLD-CCNC: 17 U/L DA — SIGNIFICANT CHANGE UP (ref 10–60)
ANION GAP SERPL CALC-SCNC: 10 MMOL/L — SIGNIFICANT CHANGE UP (ref 5–17)
APTT BLD: 27.7 SEC — SIGNIFICANT CHANGE UP (ref 24.5–35.6)
AST SERPL-CCNC: 16 U/L — SIGNIFICANT CHANGE UP (ref 10–40)
BASOPHILS # BLD AUTO: 0.02 K/UL — SIGNIFICANT CHANGE UP (ref 0–0.2)
BASOPHILS NFR BLD AUTO: 0.2 % — SIGNIFICANT CHANGE UP (ref 0–2)
BILIRUB SERPL-MCNC: 0.8 MG/DL — SIGNIFICANT CHANGE UP (ref 0.2–1.2)
BUN SERPL-MCNC: 7 MG/DL — SIGNIFICANT CHANGE UP (ref 7–18)
CALCIUM SERPL-MCNC: 9.2 MG/DL — SIGNIFICANT CHANGE UP (ref 8.4–10.5)
CHLORIDE SERPL-SCNC: 104 MMOL/L — SIGNIFICANT CHANGE UP (ref 96–108)
CO2 SERPL-SCNC: 22 MMOL/L — SIGNIFICANT CHANGE UP (ref 22–31)
CREAT SERPL-MCNC: 0.75 MG/DL — SIGNIFICANT CHANGE UP (ref 0.5–1.3)
EGFR: 109 ML/MIN/1.73M2 — SIGNIFICANT CHANGE UP
EOSINOPHIL # BLD AUTO: 0.02 K/UL — SIGNIFICANT CHANGE UP (ref 0–0.5)
EOSINOPHIL NFR BLD AUTO: 0.2 % — SIGNIFICANT CHANGE UP (ref 0–6)
FIBRINOGEN PPP-MCNC: 460 MG/DL — SIGNIFICANT CHANGE UP (ref 200–475)
GLUCOSE SERPL-MCNC: 61 MG/DL — LOW (ref 70–99)
HBV SURFACE AG SERPL QL IA: SIGNIFICANT CHANGE UP
HCT VFR BLD CALC: 38.9 % — SIGNIFICANT CHANGE UP (ref 34.5–45)
HCV AB S/CO SERPL IA: 0.11 S/CO — SIGNIFICANT CHANGE UP (ref 0–0.99)
HCV AB SERPL-IMP: SIGNIFICANT CHANGE UP
HGB BLD-MCNC: 12.9 G/DL — SIGNIFICANT CHANGE UP (ref 11.5–15.5)
HIV 1 & 2 AB SERPL IA.RAPID: SIGNIFICANT CHANGE UP
HIV 1 & 2 AB SERPL IA.RAPID: SIGNIFICANT CHANGE UP
HIV 1+2 AB+HIV1 P24 AG SERPL QL IA: SIGNIFICANT CHANGE UP
HIV 1+2 AB+HIV1 P24 AG SERPL QL IA: SIGNIFICANT CHANGE UP
IMM GRANULOCYTES NFR BLD AUTO: 0.8 % — SIGNIFICANT CHANGE UP (ref 0–0.9)
INR BLD: 0.92 RATIO — SIGNIFICANT CHANGE UP (ref 0.85–1.18)
KLEIHAUER-BETKE CALCULATION: 0 % — SIGNIFICANT CHANGE UP (ref 0–0.2)
LDH SERPL L TO P-CCNC: 205 U/L — SIGNIFICANT CHANGE UP (ref 120–225)
LYMPHOCYTES # BLD AUTO: 1.48 K/UL — SIGNIFICANT CHANGE UP (ref 1–3.3)
LYMPHOCYTES # BLD AUTO: 11.2 % — LOW (ref 13–44)
MCHC RBC-ENTMCNC: 31.1 PG — SIGNIFICANT CHANGE UP (ref 27–34)
MCHC RBC-ENTMCNC: 33.2 GM/DL — SIGNIFICANT CHANGE UP (ref 32–36)
MCV RBC AUTO: 93.7 FL — SIGNIFICANT CHANGE UP (ref 80–100)
MONOCYTES # BLD AUTO: 0.63 K/UL — SIGNIFICANT CHANGE UP (ref 0–0.9)
MONOCYTES NFR BLD AUTO: 4.8 % — SIGNIFICANT CHANGE UP (ref 2–14)
NEUTROPHILS # BLD AUTO: 10.94 K/UL — HIGH (ref 1.8–7.4)
NEUTROPHILS NFR BLD AUTO: 82.8 % — HIGH (ref 43–77)
NRBC # BLD: 0 /100 WBCS — SIGNIFICANT CHANGE UP (ref 0–0)
PLATELET # BLD AUTO: 233 K/UL — SIGNIFICANT CHANGE UP (ref 150–400)
POTASSIUM SERPL-MCNC: 3.5 MMOL/L — SIGNIFICANT CHANGE UP (ref 3.5–5.3)
POTASSIUM SERPL-SCNC: 3.5 MMOL/L — SIGNIFICANT CHANGE UP (ref 3.5–5.3)
PROT SERPL-MCNC: 7 G/DL — SIGNIFICANT CHANGE UP (ref 6–8.3)
PROTHROM AB SERPL-ACNC: 10.5 SEC — SIGNIFICANT CHANGE UP (ref 9.5–13)
RBC # BLD: 4.15 M/UL — SIGNIFICANT CHANGE UP (ref 3.8–5.2)
RBC # FLD: 14.1 % — SIGNIFICANT CHANGE UP (ref 10.3–14.5)
RUBV IGG SER-ACNC: 1.33 INDEX — SIGNIFICANT CHANGE UP
RUBV IGG SER-ACNC: 1.34 INDEX — SIGNIFICANT CHANGE UP
RUBV IGG SER-IMP: POSITIVE — SIGNIFICANT CHANGE UP
RUBV IGG SER-IMP: POSITIVE — SIGNIFICANT CHANGE UP
SODIUM SERPL-SCNC: 136 MMOL/L — SIGNIFICANT CHANGE UP (ref 135–145)
T PALLIDUM AB TITR SER: NEGATIVE — SIGNIFICANT CHANGE UP
URATE SERPL-MCNC: 3.9 MG/DL — SIGNIFICANT CHANGE UP (ref 2.5–7)
WBC # BLD: 13.19 K/UL — HIGH (ref 3.8–10.5)
WBC # FLD AUTO: 13.19 K/UL — HIGH (ref 3.8–10.5)

## 2024-08-20 PROCEDURE — 88307 TISSUE EXAM BY PATHOLOGIST: CPT | Mod: 26

## 2024-08-20 PROCEDURE — 59514 CESAREAN DELIVERY ONLY: CPT | Mod: U7,GC

## 2024-08-20 PROCEDURE — 99231 SBSQ HOSP IP/OBS SF/LOW 25: CPT

## 2024-08-20 DEVICE — SURGICEL FIBRILLAR 1 X 2": Type: IMPLANTABLE DEVICE | Status: FUNCTIONAL

## 2024-08-20 RX ORDER — ACETAMINOPHEN 325 MG/1
1000 TABLET ORAL ONCE
Refills: 0 | Status: COMPLETED | OUTPATIENT
Start: 2024-08-20 | End: 2024-08-20

## 2024-08-20 RX ORDER — FAMOTIDINE 10 MG/ML
20 INJECTION INTRAVENOUS ONCE
Refills: 0 | Status: DISCONTINUED | OUTPATIENT
Start: 2024-08-20 | End: 2024-08-20

## 2024-08-20 RX ORDER — LANOLIN
1 OINTMENT (GRAM) TOPICAL EVERY 6 HOURS
Refills: 0 | Status: DISCONTINUED | OUTPATIENT
Start: 2024-08-20 | End: 2024-08-22

## 2024-08-20 RX ORDER — VITAMIN A, ASCORBIC ACID, VITAMIN D, .ALPHA.-TOCOPHEROL, THIAMINE MONONITRATE, RIBOFLAVIN, NIACIN, PYRIDOXINE HYDROCHLORIDE, FOLIC ACID, CYANOCOBALAMIN, CALCIUM, IRON, MAGNESIUM, ZINC, AND COPPER 2700; 70; 400; 30; 1.6; 1.8; 18; 2.5; 1; 12; 100; 65; 25; 25; 2 [IU]/1; MG/1; [IU]/1; [IU]/1; MG/1; MG/1; MG/1; MG/1; MG/1; UG/1; MG/1; MG/1; MG/1; MG/1; MG/1
1 TABLET ORAL DAILY
Refills: 0 | Status: DISCONTINUED | OUTPATIENT
Start: 2024-08-20 | End: 2024-08-20

## 2024-08-20 RX ORDER — OXYTOCIN 10 UNIT/ML
333.33 AMPUL (ML) INJECTION
Qty: 20 | Refills: 0 | Status: DISCONTINUED | OUTPATIENT
Start: 2024-08-20 | End: 2024-08-20

## 2024-08-20 RX ORDER — DIPHENHYDRAMINE HCL 50 MG
25 CAPSULE ORAL EVERY 6 HOURS
Refills: 0 | Status: DISCONTINUED | OUTPATIENT
Start: 2024-08-20 | End: 2024-08-22

## 2024-08-20 RX ORDER — IBUPROFEN 600 MG
600 TABLET ORAL EVERY 6 HOURS
Refills: 0 | Status: COMPLETED | OUTPATIENT
Start: 2024-08-20 | End: 2025-07-19

## 2024-08-20 RX ORDER — CHLORHEXIDINE GLUCONATE 40 MG/ML
1 SOLUTION TOPICAL DAILY
Refills: 0 | Status: DISCONTINUED | OUTPATIENT
Start: 2024-08-20 | End: 2024-08-20

## 2024-08-20 RX ORDER — HEPARIN SODIUM,BOVINE 1000/ML
5000 VIAL (ML) INJECTION EVERY 12 HOURS
Refills: 0 | Status: DISCONTINUED | OUTPATIENT
Start: 2024-08-21 | End: 2024-08-22

## 2024-08-20 RX ORDER — ONDANSETRON 2 MG/ML
4 INJECTION, SOLUTION INTRAMUSCULAR; INTRAVENOUS ONCE
Refills: 0 | Status: DISCONTINUED | OUTPATIENT
Start: 2024-08-20 | End: 2024-08-22

## 2024-08-20 RX ORDER — AZITHROMYCIN 500 MG/1
500 TABLET, FILM COATED ORAL ONCE
Refills: 0 | Status: COMPLETED | OUTPATIENT
Start: 2024-08-20 | End: 2024-08-20

## 2024-08-20 RX ORDER — CEFAZOLIN SODIUM 2 G/100ML
2000 INJECTION, SOLUTION INTRAVENOUS ONCE
Refills: 0 | Status: COMPLETED | OUTPATIENT
Start: 2024-08-20 | End: 2024-08-20

## 2024-08-20 RX ORDER — OXYCODONE HYDROCHLORIDE 5 MG/1
5 TABLET ORAL ONCE
Refills: 0 | Status: DISCONTINUED | OUTPATIENT
Start: 2024-08-20 | End: 2024-08-22

## 2024-08-20 RX ORDER — ACETAMINOPHEN 325 MG/1
975 TABLET ORAL EVERY 6 HOURS
Refills: 0 | Status: DISCONTINUED | OUTPATIENT
Start: 2024-08-20 | End: 2024-08-22

## 2024-08-20 RX ORDER — KETOROLAC TROMETHAMINE 30 MG/ML
30 INJECTION, SOLUTION INTRAMUSCULAR EVERY 6 HOURS
Refills: 0 | Status: DISCONTINUED | OUTPATIENT
Start: 2024-08-20 | End: 2024-08-21

## 2024-08-20 RX ORDER — SODIUM CITRATE AND CITRIC ACID MONOHYDRATE 334; 500 MG/5ML; MG/5ML
30 SOLUTION ORAL ONCE
Refills: 0 | Status: COMPLETED | OUTPATIENT
Start: 2024-08-20 | End: 2024-08-20

## 2024-08-20 RX ORDER — TETANUS TOXOID, REDUCED DIPHTHERIA TOXOID AND ACELLULAR PERTUSSIS VACCINE, ADSORBED 5; 2.5; 8; 8; 2.5 [IU]/.5ML; [IU]/.5ML; UG/.5ML; UG/.5ML; UG/.5ML
0.5 SUSPENSION INTRAMUSCULAR ONCE
Refills: 0 | Status: DISCONTINUED | OUTPATIENT
Start: 2024-08-20 | End: 2024-08-22

## 2024-08-20 RX ADMIN — ACETAMINOPHEN 1000 MILLIGRAM(S): 325 TABLET ORAL at 13:59

## 2024-08-20 RX ADMIN — KETOROLAC TROMETHAMINE 30 MILLIGRAM(S): 30 INJECTION, SOLUTION INTRAMUSCULAR at 23:46

## 2024-08-20 RX ADMIN — AZITHROMYCIN 255 MILLIGRAM(S): 500 TABLET, FILM COATED ORAL at 10:43

## 2024-08-20 RX ADMIN — CEFAZOLIN SODIUM 100 MILLIGRAM(S): 2 INJECTION, SOLUTION INTRAVENOUS at 11:16

## 2024-08-20 RX ADMIN — SODIUM CITRATE AND CITRIC ACID MONOHYDRATE 30 MILLILITER(S): 334; 500 SOLUTION ORAL at 10:43

## 2024-08-20 RX ADMIN — ACETAMINOPHEN 1000 MILLIGRAM(S): 325 TABLET ORAL at 01:10

## 2024-08-20 RX ADMIN — ACETAMINOPHEN 400 MILLIGRAM(S): 325 TABLET ORAL at 09:00

## 2024-08-20 RX ADMIN — CHLORHEXIDINE GLUCONATE 1 APPLICATION(S): 40 SOLUTION TOPICAL at 10:45

## 2024-08-20 RX ADMIN — Medication 125 MILLILITER(S): at 21:40

## 2024-08-20 RX ADMIN — ACETAMINOPHEN 400 MILLIGRAM(S): 325 TABLET ORAL at 00:10

## 2024-08-20 RX ADMIN — ACETAMINOPHEN 975 MILLIGRAM(S): 325 TABLET ORAL at 21:39

## 2024-08-20 RX ADMIN — Medication 80 MILLIGRAM(S): at 21:39

## 2024-08-20 RX ADMIN — ACETAMINOPHEN 975 MILLIGRAM(S): 325 TABLET ORAL at 22:30

## 2024-08-20 RX ADMIN — Medication 1000 MILLIUNIT(S)/MIN: at 13:58

## 2024-08-20 NOTE — OB PROVIDER DELIVERY SUMMARY - NSSELHIDDEN_OBGYN_ALL_OB_FT
[NS_DeliveryAttending1_OBGYN_ALL_OB_FT:[id number]],[NS_DeliveryAssist1_OBGYN_ALL_OB_FT:[id number]]

## 2024-08-20 NOTE — OB PROVIDER LABOR PROGRESS NOTE - NS_SUBJECTIVE/OBJECTIVE_OBGYN_ALL_OB_FT
Dr. Fox and anesthesia called to bedside   contractions q1-2mins with late decel  patient c/o pelvic pain, reports contractions q5mins, denies VB  /62  abd gravid, no guarding, no rebound  no VB  nst baseline 140, moderate variability, late decel, accels  toco q1-2mins    patient counseled on urgent rpt c/s for suspected abruption given maternal/fetal status  patient understood  anesthesia consenting patient  OR room being prepped  2units ready

## 2024-08-20 NOTE — OB RN INTRAOPERATIVE NOTE - NS_SCRUBTECH_OBGYN_ALL_OB_FT
PRINCIPAL PROCEDURE  Procedure: Surgical removal of infected bypass graft of lower extremity  Findings and Treatment:     
ZULEIMA Walker
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
## 2024-08-20 NOTE — OB PROVIDER DELIVERY SUMMARY - NSPROVIDERDELIVERYNOTE_OBGYN_ALL_OB_FT
1.baby girl at 12:01 from vtx presentation,apgars 8/8,2570gms;placenta small with calcifications.  2.nl tubes and ovaries bilaterally.  3.snow used in the procedure.      dictation number:[id number]

## 2024-08-20 NOTE — OB RN DELIVERY SUMMARY - NSSELHIDDEN_OBGYN_ALL_OB_FT
[NS_DeliveryAttending1_OBGYN_ALL_OB_FT:[id number]==],[NS_DeliveryAssist1_OBGYN_ALL_OB_FT:[id number]]

## 2024-08-20 NOTE — OB PROVIDER DELIVERY SUMMARY - NSCERVICA DILATATIONATCSA_OBGYN_ALL_OB_NU
[de-identified] : Right Ankle Exam\par \par General: Alert and oriented x3.  In no acute distress.  Pleasant in nature with a normal affect.  No apparent respiratory distress. \par Erythema, Warmth, Rubor: Negative\par Swelling: Negative\par \par ROM:\par 1. Dorsiflexion: 10 degrees\par 2. Plantarflexion: 40 degrees\par 3. Inversion: 10 degrees\par 4. Eversion: 10 degrees\par \par Tenderness to Palpation: \par 1. Lateral Malleolus: Negative\par 2. Medial Malleolus: Negative\par 3. Proximal Fibular Pain: Negative\par 4. Heel Pain: Negative\par \par Ligament Pain:\par 1. ATFL/CFL/PTFL: Negative\par 2. Deltoid Ligaments: Negative\par \par Stability: \par 1. Anterior Drawer: Negative\par 2. Posterior Drawer: Negative\par \par Strength: 5/5 TA/GS/EHL\par \par Pulses: 2+ DP/PT Pulses\par \par Neuro: Intact motor and sensory\par \par Additional Test:\par 1. Torres's Test: Negative\par 2. Syndesmosis Squeeze Test: Negative [de-identified] : Xrays of right ankle obtained from outside facility on 11/18/2021 and reviewed in the office today, 11/22/2021 , revealed: Normal XRs. \par  1

## 2024-08-20 NOTE — OB PROVIDER LABOR PROGRESS NOTE - NS_SUBJECTIVE/OBJECTIVE_OBGYN_ALL_OB_FT
Patient seen and evaluated at  bedside, c/o neck pain and lower back pain, however was able to sleep through the night. Denies numbness and tingling in arms and fingers, VB, abdominal pain, LOF, headache, dizziness, n/v.  /63  gen aox3, not in acute distress, appears well  abd gravid, soft, nontender, no guarding, no rebound  back tenderness of cervical spine  sve 1/60/-4/vtx/int  nst baseline 130, moderate variability, accels, no decel  toco irritability
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
## 2024-08-20 NOTE — OB RN INTRAOPERATIVE NOTE - NSSELHIDDEN_OBGYN_ALL_OB_FT
[NS_DeliveryAttending1_OBGYN_ALL_OB_FT:[id number]==],[NS_DeliveryAssist1_OBGYN_ALL_OB_FT:[id number]]

## 2024-08-20 NOTE — OB PROVIDER LABOR PROGRESS NOTE - NS_SUBJECTIVE/OBJECTIVE_OBGYN_ALL_OB_FT
patient c/o rectal pressure, wants to have a bowel movement  /55  sve 1/60/-3/vtx/int  nst baseline 130, moderate variability, prolonged decel with recovery to baseline  toco irritability, occassional  patient is insisting on getting up to have a bowel movement on the toilet  awaiting ortho consult for neck pain  dw Dr. Fox [place] attending patient c/o rectal pressure, wants to have a bowel movement  /55  sve 1/60/-3/vtx/int  nst baseline 130, moderate variability, prolonged decel with recovery to baseline  toco irritability, occassional  patient is insisting on getting up to have a bowel movement on the toilet  awaiting ortho consult for neck pain  confirmed with patient that prenatal care is with Dr Hugo Lockhart at [facility], will try to obtain prenatal record  dw Dr. Fox [place] attending

## 2024-08-20 NOTE — OB RN INTRAOPERATIVE NOTE - NS_ANESTHESIASTART_OBGYN_ALL_OB_DT
PROGRESS NOTE: Pharmacist Pre-procedural Antibiotic Prophylaxis Review    Pharmacy has reviewed pre-procedural antibiotic prophylaxis for Tania Rodríguez (52 year old, female) who will be undergoing a ENT procedure. Specifically, the name of the procedure is: LEFT EXCISION, PAROTID GLAND. Current antibiotic order(s) include: clindamycin 900 mg IV.    Wt Readings from Last 2 Encounters:   08/06/21 100.7 kg   07/21/21 100.7 kg     ALLERGIES:   Allergen Reactions    Naprosyn [Naproxen] HIVES and Nausea & Vomiting    Augmentin [Amoclan] HIVES     Tolerated cefazolin 11/12 and 11/13/18 at another health system (and historically at [facility])    Potassium RASH       No results found for: MRSAPC  MRSA Screen? Not needed for this surgical procedure.    Per the [facility] Adult Surgical Antibiotic Prophylaxis and [facility] Approach to Beta-lactam Allergies guidelines, Pharmacist intervention(s) included: clindamycin was switched to cefazolin 2 g IV with metronidazole 500 mg IV. Patient has tolerated several doses of cefazolin historically.    Please call pharmacy with any questions.    Damon Hillman [facility] 8/31/2021  Contact #: [phone]     
Reviewed below with pt:  Patient aware of date, time and place of Covid test, and informed that once they get their COVID test :   • Do NOT travel out of home area   • Self -quarantine is recommended to minimizes your risk of exposure before your procedure, if you are unable to self-quarantine, please continue to wear a mask, practice social distancing and perform good hand hygiene.  • Immediately report any new symptoms or suspected/known exposures to COVID-19 cases  to your surgeon’s office  o fever of 100.4 or more  o new cough, or cough that gets worse  o difficulty breathing  o sore throat  o stomach problems: nausea, vomiting or diarrhea  o loss of taste or smell  o chills and/or repeated shaking with chills  o muscle pain  o headache  • Maintain physical distancing (at least 6 feet) at all times both at home and away from home  • Wash hands frequently and thoroughly with soap and water (lather at least 20 seconds) or disinfect with alcohol-based hand  before eating.  This should also be done by the person who will be bringing you to and from the procedure.  • Only one visitor allowed into building. They must remain in designated area assigned by the nursing staff and can not eat in the facility.  The visitor may have a drink if it is covered with a lid or cap.  Please do not drink while care team members are in the room.  • It will be an expectation for the patient/support person to wear a mask at all times during their stay.  Patient is aware they will not get notified of a negative result              
20-Aug-2024 11:21

## 2024-08-21 ENCOUNTER — TRANSCRIPTION ENCOUNTER (OUTPATIENT)
Age: 34
End: 2024-08-21

## 2024-08-21 DIAGNOSIS — Z98.891 HISTORY OF UTERINE SCAR FROM PREVIOUS SURGERY: ICD-10-CM

## 2024-08-21 LAB
BASOPHILS # BLD AUTO: 0.03 K/UL — SIGNIFICANT CHANGE UP (ref 0–0.2)
BASOPHILS NFR BLD AUTO: 0.2 % — SIGNIFICANT CHANGE UP (ref 0–2)
EOSINOPHIL # BLD AUTO: 0.03 K/UL — SIGNIFICANT CHANGE UP (ref 0–0.5)
EOSINOPHIL NFR BLD AUTO: 0.2 % — SIGNIFICANT CHANGE UP (ref 0–6)
HBV SURFACE AG SERPL QL IA: SIGNIFICANT CHANGE UP
HCT VFR BLD CALC: 32.8 % — LOW (ref 34.5–45)
HGB BLD-MCNC: 10.9 G/DL — LOW (ref 11.5–15.5)
IMM GRANULOCYTES NFR BLD AUTO: 0.7 % — SIGNIFICANT CHANGE UP (ref 0–0.9)
LYMPHOCYTES # BLD AUTO: 1.53 K/UL — SIGNIFICANT CHANGE UP (ref 1–3.3)
LYMPHOCYTES # BLD AUTO: 12.5 % — LOW (ref 13–44)
MCHC RBC-ENTMCNC: 30.9 PG — SIGNIFICANT CHANGE UP (ref 27–34)
MCHC RBC-ENTMCNC: 33.2 GM/DL — SIGNIFICANT CHANGE UP (ref 32–36)
MCV RBC AUTO: 92.9 FL — SIGNIFICANT CHANGE UP (ref 80–100)
MONOCYTES # BLD AUTO: 0.93 K/UL — HIGH (ref 0–0.9)
MONOCYTES NFR BLD AUTO: 7.6 % — SIGNIFICANT CHANGE UP (ref 2–14)
NEUTROPHILS # BLD AUTO: 9.66 K/UL — HIGH (ref 1.8–7.4)
NEUTROPHILS NFR BLD AUTO: 78.8 % — HIGH (ref 43–77)
NRBC # BLD: 0 /100 WBCS — SIGNIFICANT CHANGE UP (ref 0–0)
PLATELET # BLD AUTO: 217 K/UL — SIGNIFICANT CHANGE UP (ref 150–400)
RBC # BLD: 3.53 M/UL — LOW (ref 3.8–5.2)
RBC # FLD: 13.6 % — SIGNIFICANT CHANGE UP (ref 10.3–14.5)
WBC # BLD: 12.27 K/UL — HIGH (ref 3.8–10.5)
WBC # FLD AUTO: 12.27 K/UL — HIGH (ref 3.8–10.5)

## 2024-08-21 RX ORDER — IBUPROFEN 600 MG
600 TABLET ORAL EVERY 6 HOURS
Refills: 0 | Status: DISCONTINUED | OUTPATIENT
Start: 2024-08-21 | End: 2024-08-22

## 2024-08-21 RX ADMIN — ACETAMINOPHEN 975 MILLIGRAM(S): 325 TABLET ORAL at 18:29

## 2024-08-21 RX ADMIN — KETOROLAC TROMETHAMINE 30 MILLIGRAM(S): 30 INJECTION, SOLUTION INTRAMUSCULAR at 05:44

## 2024-08-21 RX ADMIN — ACETAMINOPHEN 975 MILLIGRAM(S): 325 TABLET ORAL at 11:47

## 2024-08-21 RX ADMIN — KETOROLAC TROMETHAMINE 30 MILLIGRAM(S): 30 INJECTION, SOLUTION INTRAMUSCULAR at 11:47

## 2024-08-21 RX ADMIN — Medication 600 MILLIGRAM(S): at 23:00

## 2024-08-21 RX ADMIN — Medication 80 MILLIGRAM(S): at 05:44

## 2024-08-21 RX ADMIN — KETOROLAC TROMETHAMINE 30 MILLIGRAM(S): 30 INJECTION, SOLUTION INTRAMUSCULAR at 12:47

## 2024-08-21 RX ADMIN — Medication 5000 UNIT(S): at 00:55

## 2024-08-21 RX ADMIN — Medication 600 MILLIGRAM(S): at 18:29

## 2024-08-21 RX ADMIN — Medication 600 MILLIGRAM(S): at 19:29

## 2024-08-21 RX ADMIN — KETOROLAC TROMETHAMINE 30 MILLIGRAM(S): 30 INJECTION, SOLUTION INTRAMUSCULAR at 06:30

## 2024-08-21 RX ADMIN — Medication 125 MILLILITER(S): at 06:07

## 2024-08-21 RX ADMIN — ACETAMINOPHEN 975 MILLIGRAM(S): 325 TABLET ORAL at 19:29

## 2024-08-21 RX ADMIN — ACETAMINOPHEN 975 MILLIGRAM(S): 325 TABLET ORAL at 12:47

## 2024-08-21 RX ADMIN — Medication 5000 UNIT(S): at 15:37

## 2024-08-21 RX ADMIN — KETOROLAC TROMETHAMINE 30 MILLIGRAM(S): 30 INJECTION, SOLUTION INTRAMUSCULAR at 00:30
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
## 2024-08-21 NOTE — LACTATION INITIAL EVALUATION - LACTATION INTERVENTIONS
Infant in NICU 35 weeks sp c/s. Mother reports pumping all night to stimulate breast and was able to collect colostum. Mom taught hand expression and provided with electric breast pump.  Instructions given for use, frequency and duration, collection and storage and cleaning of kit parts.  Reinforced importance of stimulating/expressing 8-12X/24 hours for the establishment, maintenance and expression of breastmilk./initiate/review safe skin-to-skin/initiate/review hand expression/initiate/review supplementation plan due to medical indications/review techniques to increase milk supply/initiate/review breast massage/compression

## 2024-08-22 VITALS
OXYGEN SATURATION: 100 % | SYSTOLIC BLOOD PRESSURE: 108 MMHG | HEART RATE: 78 BPM | DIASTOLIC BLOOD PRESSURE: 65 MMHG | RESPIRATION RATE: 17 BRPM | TEMPERATURE: 98 F

## 2024-08-22 LAB
HCT VFR BLD CALC: 32.3 % — LOW (ref 34.5–45)
HGB BLD-MCNC: 10.5 G/DL — LOW (ref 11.5–15.5)
MCHC RBC-ENTMCNC: 30.3 PG — SIGNIFICANT CHANGE UP (ref 27–34)
MCHC RBC-ENTMCNC: 32.5 GM/DL — SIGNIFICANT CHANGE UP (ref 32–36)
MCV RBC AUTO: 93.1 FL — SIGNIFICANT CHANGE UP (ref 80–100)
NRBC # BLD: 0 /100 WBCS — SIGNIFICANT CHANGE UP (ref 0–0)
PLATELET # BLD AUTO: 250 K/UL — SIGNIFICANT CHANGE UP (ref 150–400)
RBC # BLD: 3.47 M/UL — LOW (ref 3.8–5.2)
RBC # FLD: 13.8 % — SIGNIFICANT CHANGE UP (ref 10.3–14.5)
WBC # BLD: 11.65 K/UL — HIGH (ref 3.8–10.5)
WBC # FLD AUTO: 11.65 K/UL — HIGH (ref 3.8–10.5)

## 2024-08-22 PROCEDURE — 86703 HIV-1/HIV-2 1 RESULT ANTBDY: CPT

## 2024-08-22 PROCEDURE — 85025 COMPLETE CBC W/AUTO DIFF WBC: CPT

## 2024-08-22 PROCEDURE — 83615 LACTATE (LD) (LDH) ENZYME: CPT

## 2024-08-22 PROCEDURE — C1889: CPT

## 2024-08-22 PROCEDURE — 86850 RBC ANTIBODY SCREEN: CPT

## 2024-08-22 PROCEDURE — 86923 COMPATIBILITY TEST ELECTRIC: CPT

## 2024-08-22 PROCEDURE — 72141 MRI NECK SPINE W/O DYE: CPT | Mod: MC

## 2024-08-22 PROCEDURE — 86803 HEPATITIS C AB TEST: CPT

## 2024-08-22 PROCEDURE — 59050 FETAL MONITOR W/REPORT: CPT

## 2024-08-22 PROCEDURE — 81001 URINALYSIS AUTO W/SCOPE: CPT

## 2024-08-22 PROCEDURE — 85730 THROMBOPLASTIN TIME PARTIAL: CPT

## 2024-08-22 PROCEDURE — 87340 HEPATITIS B SURFACE AG IA: CPT

## 2024-08-22 PROCEDURE — 86900 BLOOD TYPING SEROLOGIC ABO: CPT

## 2024-08-22 PROCEDURE — 86780 TREPONEMA PALLIDUM: CPT

## 2024-08-22 PROCEDURE — 85460 HEMOGLOBIN FETAL: CPT

## 2024-08-22 PROCEDURE — 88307 TISSUE EXAM BY PATHOLOGIST: CPT

## 2024-08-22 PROCEDURE — 85027 COMPLETE CBC AUTOMATED: CPT

## 2024-08-22 PROCEDURE — 72141 MRI NECK SPINE W/O DYE: CPT | Mod: 26

## 2024-08-22 PROCEDURE — 86901 BLOOD TYPING SEROLOGIC RH(D): CPT

## 2024-08-22 PROCEDURE — 85610 PROTHROMBIN TIME: CPT

## 2024-08-22 PROCEDURE — 85384 FIBRINOGEN ACTIVITY: CPT

## 2024-08-22 PROCEDURE — 87389 HIV-1 AG W/HIV-1&-2 AB AG IA: CPT

## 2024-08-22 PROCEDURE — 36415 COLL VENOUS BLD VENIPUNCTURE: CPT

## 2024-08-22 PROCEDURE — 80053 COMPREHEN METABOLIC PANEL: CPT

## 2024-08-22 PROCEDURE — 76819 FETAL BIOPHYS PROFIL W/O NST: CPT

## 2024-08-22 PROCEDURE — 84550 ASSAY OF BLOOD/URIC ACID: CPT

## 2024-08-22 PROCEDURE — 86762 RUBELLA ANTIBODY: CPT

## 2024-08-22 RX ORDER — ACETAMINOPHEN 325 MG/1
2 TABLET ORAL
Qty: 56 | Refills: 0
Start: 2024-08-22 | End: 2024-08-28

## 2024-08-22 RX ORDER — IBUPROFEN 600 MG
1 TABLET ORAL
Qty: 28 | Refills: 0
Start: 2024-08-22 | End: 2024-08-28

## 2024-08-22 RX ORDER — VITAMIN A, ASCORBIC ACID, VITAMIN D, .ALPHA.-TOCOPHEROL, THIAMINE MONONITRATE, RIBOFLAVIN, NIACIN, PYRIDOXINE HYDROCHLORIDE, FOLIC ACID, CYANOCOBALAMIN, CALCIUM, IRON, MAGNESIUM, ZINC, AND COPPER 2700; 70; 400; 30; 1.6; 1.8; 18; 2.5; 1; 12; 100; 65; 25; 25; 2 [IU]/1; MG/1; [IU]/1; [IU]/1; MG/1; MG/1; MG/1; MG/1; MG/1; UG/1; MG/1; MG/1; MG/1; MG/1; MG/1
1 TABLET ORAL
Qty: 30 | Refills: 0
Start: 2024-08-22 | End: 2024-09-20

## 2024-08-22 RX ADMIN — ACETAMINOPHEN 975 MILLIGRAM(S): 325 TABLET ORAL at 02:02

## 2024-08-22 RX ADMIN — ACETAMINOPHEN 975 MILLIGRAM(S): 325 TABLET ORAL at 09:22

## 2024-08-22 RX ADMIN — ACETAMINOPHEN 975 MILLIGRAM(S): 325 TABLET ORAL at 03:02

## 2024-08-22 RX ADMIN — ACETAMINOPHEN 975 MILLIGRAM(S): 325 TABLET ORAL at 10:22

## 2024-08-22 RX ADMIN — Medication 5000 UNIT(S): at 02:02

## 2024-08-22 RX ADMIN — Medication 600 MILLIGRAM(S): at 00:00

## 2024-08-22 RX ADMIN — Medication 600 MILLIGRAM(S): at 06:10

## 2024-08-22 RX ADMIN — Medication 600 MILLIGRAM(S): at 07:10
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
## 2024-08-22 NOTE — DISCHARGE NOTE OB - PATIENT PORTAL LINK FT
You can access the FollowMyHealth Patient Portal offered by [facility] by registering at the following website: [email or web address]. By joining [employer]’s FollowMyHealth portal, you will also be able to view your health information using other applications (apps) compatible with our system.
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
## 2024-08-22 NOTE — DISCHARGE NOTE OB - BREASTFEEDING PROVIDES MATERNAL HEALTH BENEFITS, DECREASED PREMENOPAUSAL BREAST CANCER, OVARIAN CANCER AND TYPE II DIABETES MELLITUS
Pt lethargic, unable to provide information regarding social history/prior level of function. Per H&P: pt ambulates with a walker (shuffles). Has 24 hr home health aide services. Please refer to care coordination assessment when available. Statement Selected

## 2024-08-22 NOTE — DISCHARGE NOTE OB - MATERIALS PROVIDED
Vaccinations/[facility]  Screening Program/  Immunization Record/Breastfeeding Log/Breastfeeding Mother’s Support Group Information/Guide to Postpartum Care/[facility] Hearing Screen Program/Back To Sleep Handout/Shaken Baby Prevention Handout/Breastfeeding Guide and Packet/Birth Certificate Instructions/Discharge Medication Information for Patients and Families Pocket Guide
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
## 2024-08-22 NOTE — DISCHARGE NOTE OB - MEDICATION SUMMARY - MEDICATIONS TO TAKE
I will START or STAY ON the medications listed below when I get home from the hospital:    ibuprofen 600 mg oral tablet  -- 1 tab(s) by mouth every 6 hours as needed for  moderate pain  -- Indication: For Status post repeat low transverse  section    acetaminophen 500 mg oral tablet  -- 2 tab(s) by mouth every 6 hours as needed for  mild pain  -- Indication: For Status post repeat low transverse  section    Prenatal Plus oral tablet  -- 1 tab(s) by mouth once a day  -- Indication: For Status post repeat low transverse  section    Colace 2-in-1 50 mg-8.6 mg oral tablet  -- 2 tab(s) by mouth once a day (at bedtime) as needed for  constipation  -- Indication: For Status post repeat low transverse  section
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
## 2024-08-22 NOTE — DISCHARGE NOTE OB - PLAN OF CARE
ortho consulted   MRI negative Continue breastfeeding.  Motrin as needed for pain.  Ambulate daily.  No heavy lifting or anything per vagina x 6 weeks - no sex, tampons, douching, tub baths, etc.  Follow up in office in 1-2 weeks for incision check, and then at 6 weeks for postpartum check.
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
## 2024-09-06 ENCOUNTER — EMERGENCY (EMERGENCY)
Facility: HOSPITAL | Age: 34
LOS: 1 days | Discharge: HOME | End: 2024-09-06
Attending: EMERGENCY MEDICINE
Payer: MEDICAID

## 2024-09-06 VITALS
DIASTOLIC BLOOD PRESSURE: 72 MMHG | WEIGHT: 139.99 LBS | HEIGHT: 69 IN | HEART RATE: 76 BPM | OXYGEN SATURATION: 98 % | SYSTOLIC BLOOD PRESSURE: 111 MMHG | RESPIRATION RATE: 18 BRPM | TEMPERATURE: 98 F

## 2024-09-06 DIAGNOSIS — Z90.79 ACQUIRED ABSENCE OF OTHER GENITAL ORGAN(S): Chronic | ICD-10-CM

## 2024-09-06 DIAGNOSIS — Z98.890 OTHER SPECIFIED POSTPROCEDURAL STATES: Chronic | ICD-10-CM

## 2024-09-06 DIAGNOSIS — Z98.891 HISTORY OF UTERINE SCAR FROM PREVIOUS SURGERY: Chronic | ICD-10-CM

## 2024-09-06 LAB
ALBUMIN SERPL ELPH-MCNC: 3.3 G/DL — LOW (ref 3.5–5)
ALP SERPL-CCNC: 120 U/L — SIGNIFICANT CHANGE UP (ref 40–120)
ALT FLD-CCNC: 26 U/L DA — SIGNIFICANT CHANGE UP (ref 10–60)
ANION GAP SERPL CALC-SCNC: 4 MMOL/L — LOW (ref 5–17)
APPEARANCE UR: CLEAR — SIGNIFICANT CHANGE UP
AST SERPL-CCNC: 18 U/L — SIGNIFICANT CHANGE UP (ref 10–40)
BACTERIA # UR AUTO: ABNORMAL /HPF
BASOPHILS # BLD AUTO: 0.02 K/UL — SIGNIFICANT CHANGE UP (ref 0–0.2)
BASOPHILS NFR BLD AUTO: 0.3 % — SIGNIFICANT CHANGE UP (ref 0–2)
BILIRUB SERPL-MCNC: 0.5 MG/DL — SIGNIFICANT CHANGE UP (ref 0.2–1.2)
BILIRUB UR-MCNC: NEGATIVE — SIGNIFICANT CHANGE UP
BUN SERPL-MCNC: 14 MG/DL — SIGNIFICANT CHANGE UP (ref 7–18)
CALCIUM SERPL-MCNC: 8.9 MG/DL — SIGNIFICANT CHANGE UP (ref 8.4–10.5)
CHLORIDE SERPL-SCNC: 106 MMOL/L — SIGNIFICANT CHANGE UP (ref 96–108)
CO2 SERPL-SCNC: 27 MMOL/L — SIGNIFICANT CHANGE UP (ref 22–31)
COLOR SPEC: YELLOW — SIGNIFICANT CHANGE UP
CREAT SERPL-MCNC: 0.81 MG/DL — SIGNIFICANT CHANGE UP (ref 0.5–1.3)
DIFF PNL FLD: NEGATIVE — SIGNIFICANT CHANGE UP
EGFR: 99 ML/MIN/1.73M2 — SIGNIFICANT CHANGE UP
EOSINOPHIL # BLD AUTO: 0.07 K/UL — SIGNIFICANT CHANGE UP (ref 0–0.5)
EOSINOPHIL NFR BLD AUTO: 0.9 % — SIGNIFICANT CHANGE UP (ref 0–6)
EPI CELLS # UR: PRESENT
GLUCOSE SERPL-MCNC: 75 MG/DL — SIGNIFICANT CHANGE UP (ref 70–99)
GLUCOSE UR QL: NEGATIVE MG/DL — SIGNIFICANT CHANGE UP
HCT VFR BLD CALC: 39 % — SIGNIFICANT CHANGE UP (ref 34.5–45)
HGB BLD-MCNC: 12.6 G/DL — SIGNIFICANT CHANGE UP (ref 11.5–15.5)
IMM GRANULOCYTES NFR BLD AUTO: 0.1 % — SIGNIFICANT CHANGE UP (ref 0–0.9)
KETONES UR-MCNC: NEGATIVE MG/DL — SIGNIFICANT CHANGE UP
LEUKOCYTE ESTERASE UR-ACNC: ABNORMAL
LIDOCAIN IGE QN: 50 U/L — SIGNIFICANT CHANGE UP (ref 13–75)
LYMPHOCYTES # BLD AUTO: 1.97 K/UL — SIGNIFICANT CHANGE UP (ref 1–3.3)
LYMPHOCYTES # BLD AUTO: 24.7 % — SIGNIFICANT CHANGE UP (ref 13–44)
MCHC RBC-ENTMCNC: 30.5 PG — SIGNIFICANT CHANGE UP (ref 27–34)
MCHC RBC-ENTMCNC: 32.3 GM/DL — SIGNIFICANT CHANGE UP (ref 32–36)
MCV RBC AUTO: 94.4 FL — SIGNIFICANT CHANGE UP (ref 80–100)
MONOCYTES # BLD AUTO: 0.75 K/UL — SIGNIFICANT CHANGE UP (ref 0–0.9)
MONOCYTES NFR BLD AUTO: 9.4 % — SIGNIFICANT CHANGE UP (ref 2–14)
NEUTROPHILS # BLD AUTO: 5.14 K/UL — SIGNIFICANT CHANGE UP (ref 1.8–7.4)
NEUTROPHILS NFR BLD AUTO: 64.6 % — SIGNIFICANT CHANGE UP (ref 43–77)
NITRITE UR-MCNC: NEGATIVE — SIGNIFICANT CHANGE UP
NRBC # BLD: 0 /100 WBCS — SIGNIFICANT CHANGE UP (ref 0–0)
PH UR: 6.5 — SIGNIFICANT CHANGE UP (ref 5–8)
PLATELET # BLD AUTO: 387 K/UL — SIGNIFICANT CHANGE UP (ref 150–400)
POTASSIUM SERPL-MCNC: 3.7 MMOL/L — SIGNIFICANT CHANGE UP (ref 3.5–5.3)
POTASSIUM SERPL-SCNC: 3.7 MMOL/L — SIGNIFICANT CHANGE UP (ref 3.5–5.3)
PROT SERPL-MCNC: 7.3 G/DL — SIGNIFICANT CHANGE UP (ref 6–8.3)
PROT UR-MCNC: NEGATIVE MG/DL — SIGNIFICANT CHANGE UP
RBC # BLD: 4.13 M/UL — SIGNIFICANT CHANGE UP (ref 3.8–5.2)
RBC # FLD: 13 % — SIGNIFICANT CHANGE UP (ref 10.3–14.5)
RBC CASTS # UR COMP ASSIST: 2 /HPF — SIGNIFICANT CHANGE UP (ref 0–4)
SODIUM SERPL-SCNC: 137 MMOL/L — SIGNIFICANT CHANGE UP (ref 135–145)
SP GR SPEC: 1.02 — SIGNIFICANT CHANGE UP (ref 1–1.03)
UROBILINOGEN FLD QL: 1 MG/DL — SIGNIFICANT CHANGE UP (ref 0.2–1)
WBC # BLD: 7.96 K/UL — SIGNIFICANT CHANGE UP (ref 3.8–10.5)
WBC # FLD AUTO: 7.96 K/UL — SIGNIFICANT CHANGE UP (ref 3.8–10.5)
WBC UR QL: >50 /HPF — HIGH (ref 0–5)

## 2024-09-06 PROCEDURE — 81001 URINALYSIS AUTO W/SCOPE: CPT

## 2024-09-06 PROCEDURE — 85025 COMPLETE CBC W/AUTO DIFF WBC: CPT

## 2024-09-06 PROCEDURE — 99284 EMERGENCY DEPT VISIT MOD MDM: CPT

## 2024-09-06 PROCEDURE — 87086 URINE CULTURE/COLONY COUNT: CPT

## 2024-09-06 PROCEDURE — 80053 COMPREHEN METABOLIC PANEL: CPT

## 2024-09-06 PROCEDURE — 99283 EMERGENCY DEPT VISIT LOW MDM: CPT | Mod: 25

## 2024-09-06 PROCEDURE — 36415 COLL VENOUS BLD VENIPUNCTURE: CPT

## 2024-09-06 PROCEDURE — 83690 ASSAY OF LIPASE: CPT

## 2024-09-06 RX ORDER — CEFUROXIME SODIUM 1.5 G
250 VIAL (EA) INJECTION ONCE
Refills: 0 | Status: COMPLETED | OUTPATIENT
Start: 2024-09-06 | End: 2024-09-06

## 2024-09-06 RX ORDER — CEFUROXIME SODIUM 1.5 G
1 VIAL (EA) INJECTION
Qty: 14 | Refills: 0
Start: 2024-09-06 | End: 2024-09-12

## 2024-09-06 RX ADMIN — Medication 250 MILLIGRAM(S): at 19:32

## 2024-09-06 NOTE — ED ADULT TRIAGE NOTE - CHIEF COMPLAINT QUOTE
as per pt I gave birth c section on 8/20/24 the first day that I came home there was blood in the area where surgery was done,denies redness,no fever,pain in the surgical site

## 2024-09-06 NOTE — ED PROVIDER NOTE - PROGRESS NOTE DETAILS
Educated patient on results of labs and urinalysis.  Patient clinically with signs suggestive of UTI.  Will provide you p.o. antibiotics.  Also provide closer GYN follow-up.

## 2024-09-06 NOTE — ED PROVIDER NOTE - PHYSICAL EXAMINATION
surgical wound clean dry and intact without active drainage  Negative erythema  Negative induration  Slight tenderness to left incision edge without any focal fluctuance

## 2024-09-06 NOTE — ED PROVIDER NOTE - CLINICAL SUMMARY MEDICAL DECISION MAKING FREE TEXT BOX
33-year-old female with report of bloody drainage from left side of  incision.  Patient had  performed more than 2 weeks ago.  Incision itself appears dry with no signs of dehiscence.  No signs to suggest skin infection or abscess.  Plan to send labs and urinalysis and reassess.

## 2024-09-06 NOTE — ED PROVIDER NOTE - PATIENT PORTAL LINK FT
You can access the FollowMyHealth Patient Portal offered by [facility] by registering at the following website: [email or web address]. By joining [facility]’s FollowMyHealth portal, you will also be able to view your health information using other applications (apps) compatible with our system.

## 2024-09-06 NOTE — ED PROVIDER NOTE - OBJECTIVE STATEMENT
33-year-old female status post repeat  on  presenting with gradual onset of bloody drainage to left side of incision.  Patient states symptoms started within 2 or 3 days of being discharged from the hospital.  Also feels tenderness and slight swelling to that area.  Patient was given appointment for follow-up next month.  Relates slight dysuria.  Denies having any fever, chills or purulent discharge.

## 2024-09-06 NOTE — ED PROVIDER NOTE - NSFOLLOWUPINSTRUCTIONS_ED_ALL_ED_FT
Urinary Tract Infection in Women    WHAT YOU NEED TO KNOW:    A urinary tract infection (UTI) is caused by bacteria that get inside your urinary tract. Your urinary tract includes your kidneys, ureters, bladder, and urethra. A UTI is more common in your lower urinary tract, which includes your bladder and urethra.  Female Urinary System    DISCHARGE INSTRUCTIONS:    Seek care immediately if:    You are urinating very little or not at all.    You have a high fever with shaking chills.    You have side or back pain that gets worse.  Call your doctor if:    You have a fever.    You do not feel better after 2 days of taking antibiotics.    You have new symptoms, such as blood or pus in your urine.    You are vomiting.    You have questions or concerns about your condition or care.  Medicines:    Antibiotics treat a bacterial infection. If you have UTIs often (called recurrent UTIs), you may be given antibiotics to take regularly. You will be given directions for when and how to use antibiotics. The goal is to prevent UTIs but not cause antibiotic resistance by using antibiotics too often.    Medicines may be given to decrease pain and burning when you urinate. They will also help decrease the feeling that you need to urinate often. These medicines may make your urine orange or red.    Take your medicine as directed. Contact your healthcare provider if you think your medicine is not helping or if you have side effects. Tell your provider if you are allergic to any medicine. Keep a list of the medicines, vitamins, and herbs you take. Include the amounts, and when and why you take them. Bring the list or the pill bottles to follow-up visits. Carry your medicine list with you in case of an emergency.  Follow up with your doctor as directed: Write down your questions so you remember to ask them during your visits.    Prevent another UTI:    Empty your bladder often. Urinate and empty your bladder as soon as you feel the need. Do not hold your urine for long periods of time.    Wipe from front to back after you urinate or have a bowel movement. This will help prevent germs from getting into your urinary tract through your urethra.    Drink liquids as directed. Ask how much liquid to drink each day and which liquids are best for you. You may need to drink more liquids than usual to help flush out the bacteria. Do not drink alcohol, caffeine, or citrus juices. These can irritate your bladder and increase your symptoms. Your healthcare provider may recommend cranberry juice to help prevent a UTI.    Urinate before and after you have sex. This can help flush out bacteria passed during sex.    Do not douche or use feminine deodorants. These can change the chemical balance in your vagina.    Change sanitary pads or tampons often. This will help prevent germs from getting into your urinary tract.    Talk to your healthcare provider about your birth control method. You may need to change your method if it is increasing your risk for UTIs.    Wear cotton underwear and clothes that are loose. Tight pants and nylon underwear can trap moisture and cause bacteria to grow.    Vaginal estrogen may be recommended. This medicine helps prevent UTIs in women who have gone through menopause or are in nathaniel-menopause.    Do pelvic muscle exercises often. Pelvic muscle exercises may help you start and stop urinating. Strong pelvic muscles may help you empty your bladder easier. Squeeze these muscles tightly for 5 seconds like you are trying to hold back urine. Then relax for 5 seconds. Gradually work up to squeezing for 10 seconds. Do 3 sets of 15 repetitions a day, or as directed.  © [employer] 1973, 2024     Wound Healing and Your Diet    WHAT YOU NEED TO KNOW:    Why are healthy foods important for wound healing? Your body uses nutrients from healthy foods to heal wounds caused by injury, surgery, or pressure injuries. There is no special diet that will heal your wound, but a healthy meal plan can help your wound heal faster. Nutrients that are important for healing are protein, zinc, and vitamin C. Liquids are also important for wound healing.    How do I follow a healthy meal plan?    Eat a variety of foods from each food group every day. Eat regular meals and snacks to help you get enough calories and nutrients. If you have trouble eating 3 meals each day, eat 5 to 6 small meals throughout the day instead. Include good sources of protein, zinc, and vitamin C each day.  Healthy Foods      Drink liquids as directed. Drink plenty of liquids during and between meals, unless your healthcare provider has told you to limit liquids. Ask your healthcare provider or dietitian how much liquid to drink each day and which liquids are best for you.    Limit unhealthy foods, such as those that are high in fat, sugar, and salt. Examples include doughnuts, cookies, fried foods, candy, and regular soda. These kinds of foods are low in nutrients that are important for healing.  Which foods are good sources of protein? Your dietitian will tell you how much protein and how many calories you need each day. The average amount of protein in foods is listed below in grams (g). To find the exact amount of protein in a food, read the food labels on packaged items.    Dairy:  1 cup of any type of milk (8 g)    ½ cup of evaporated canned milk (9 g)    ¼ cup of nonfat dry milk (11 g)    1 ounce of semi-hard or solid cheese (7 g)    ¼ cup of parmesan cheese (8 g)    ½ cup of cottage cheese (14 g)    ½ cup of pudding (4 g)    1 cup of plain or fruit yogurt (8 g)    Meats and meat substitutes:  3 ounces of cooked freshwater fish (21 g)    3 ounces of cooked shellfish (19 g)    ½ cup of canned tuna (14 g)    3 ounces of cooked chicken, turkey, or other poultry (24 g)    3 ounces of cooked beef, pork, lamb, or other red meat (21 g)    1 large egg (6 g)    ¼ cup of fat-free egg substitute (5 g)    ½ cup of tofu or tempeh (10 g)    1 cup of cooked dried beans, such as jernigan, kidney, or navy (15 g)    Nuts and seeds:  2 tablespoons of almonds, cashews, sunflower seeds, or walnuts (5 g)    2 tablespoons of peanuts (7 g)    2 tablespoons of peanut butter (8 g)  Sources of Protein  How can I add extra protein to foods?    Add powdered milk to milk, cereals, scrambled eggs, soups, and casseroles.    Add cheese to sauces, soups, or vegetables.    Add eggs to tuna, salads, sauces, or casseroles.    Add nutrition supplements and breakfast drink mixes to milk or shakes.    Add nuts to foods or eat them as snacks.    Add meat (beef, chicken, or pork) to soups, casseroles, pasta dishes, or vegetables.    Add beans, peas, and other legumes to salads.    Eat cottage cheese or yogurt with fruit.  What foods are good sources of vitamin C? Vitamin C is found in fruits and vegetables. Fruits such as oranges, strawberries, grapefruit, cantaloupe, and tangerines are good sources of vitamin C. Red and green bell peppers, broccoli, potatoes, tomatoes, and cabbage are also high in vitamin C.  Sources of Vitamin C    What foods are good sources of zinc? Good sources of zinc are beef, liver, and crab. Smaller amounts of zinc are found in sunflower seeds, almonds, peanut butter, eggs, and milk. Other foods that contain zinc include wheat germ, black-eyed peas, and whole-grain products.    How can I add extra calories to foods? Your dietitian may recommend that you add extra calories to your meals if you are not eating enough. You can increase calories by adding butter, margarine, sugar, or jam to foods. Work with your dietitian if you have other medical conditions and need to follow a special diet.    What else should I know about nutrients and wound healing? Your healthcare provider or dietitian may recommend vitamin and nutrition supplements if your body is low in certain nutrients. If your dietitian recommends a liquid nutrition supplement, take it between meals. Ask your healthcare provider which supplements are right for you.    CARE AGREEMENT:    You have the right to help plan your care. Discuss treatment options with your healthcare provider to decide what care you want to receive. You always have the right to refuse treatment.    © [employer] 1973, 2024

## 2024-09-07 LAB
CULTURE RESULTS: SIGNIFICANT CHANGE UP
SPECIMEN SOURCE: SIGNIFICANT CHANGE UP

## 2024-09-19 ENCOUNTER — APPOINTMENT (OUTPATIENT)
Dept: OBGYN | Facility: CLINIC | Age: 34
End: 2024-09-19

## 2024-12-03 NOTE — OB RN INTRAOPERATIVE NOTE - NS_SKINPREP_OBGYN_ALL_OB
Surgeon: Dr. Hernandez  Assistant Surgeon: Dr. Kenney  Surgical Date: 12/24/24  Surgery: Single Site Robotic Hysterectomy with Bilateral Salpingectomy (CPT: 14386) at the Main OR.   Diagnosis:   Menorrhagia with irregular cycle  (primary encounter diagnosis)  Nurse Appointment: 12/16/24 at 3:30pm with Nurse.  Post-op Appointment: 1/6/25 at 11:40am with Dr. Hernandez.  Post-op Appointment: 2/6/25 at 8:50am with Dr. Hernandez.  Insurance Coverage: Patient confirms [employer] as their insurance coverage. Pre-authorization Department will verify if authorization is required for surgery. . Patient is aware to contact us if there is a change in insurance from now until surgery.  Rep Needed: ALBA  MA Forms Needed: ALBA  STAAR Kit Needed: Yes - to be given at 12/16/24 appointment.  Cleanse instructed: CHG soap. To use the night before and morning of surgery.  Carbo Drink Needed 5 1/2 Hours Prior: Yes.  Medications to hold other than most Vitamins and Supplements: ibuprofen 5 days per [facility] Medications to Hold Prior to Surgery List.  Additional Information: ALBA     Betadine

## (undated) DEVICE — ENDOCATCH 10MM SPECIMEN POUCH

## (undated) DEVICE — DRAPE 3/4 SHEET W REINFORCEMENT 56X77"

## (undated) DEVICE — SOL IRR POUR NS 0.9% 500ML

## (undated) DEVICE — TROCAR COVIDIEN BLUNT TIP HASSAN 10MM

## (undated) DEVICE — UTERINE MANIPULATOR CLINICAL INNOVATIONS CLEARVIEW 7CM

## (undated) DEVICE — SCOPE WARMER SEAL DISP

## (undated) DEVICE — PREP BETADINE SPONGE STICKS

## (undated) DEVICE — D HELP - CLEARVIEW CLEARIFY SYSTEM

## (undated) DEVICE — LIGASURE ATLAS 10MM 37CM

## (undated) DEVICE — VENODYNE/SCD SLEEVE CALF LARGE

## (undated) DEVICE — DRAPE GENERAL ENDOSCOPY

## (undated) DEVICE — NDL HYPO SAFE 22G X 1.5" (BLACK)

## (undated) DEVICE — PREP CHLOROHEXIDINE 4% 118CC KIT

## (undated) DEVICE — BLADE SCALPEL SAFETYLOCK #10

## (undated) DEVICE — SPECIMEN CONTAINER 100ML

## (undated) DEVICE — ELCTR BOVIE PENCIL SMOKE EVACUATION

## (undated) DEVICE — MEDICATION LABELS W MARKER

## (undated) DEVICE — SUT POLYSORB 0 30" GS-23

## (undated) DEVICE — LIGASURE MARYLAND 37CM

## (undated) DEVICE — SYR LUER LOK 10CC

## (undated) DEVICE — DISSECTOR ENDO PEANUT 5MM

## (undated) DEVICE — MARKING PEN W RULER

## (undated) DEVICE — FOLEY TRAY 16FR 5CC LTX UMETER CLOSED

## (undated) DEVICE — DRAPE LIGHT HANDLE COVER (BLUE)

## (undated) DEVICE — TROCAR COVIDIEN VERSASTEP PLUS 11MM STANDARD

## (undated) DEVICE — SOL IRR POUR H2O 250ML

## (undated) DEVICE — DRAPE INSTRUMENT POUCH 6.75" X 11"

## (undated) DEVICE — TROCAR GELPOINT MINI ADVANCED

## (undated) DEVICE — TUBING INSUFFLATION LAP FILTER 10FT

## (undated) DEVICE — GOWN TRIMAX LG

## (undated) DEVICE — UTERINE MANIPULATOR CONMED VCARE SM 32MM

## (undated) DEVICE — TUBING SUCTION 20FT

## (undated) DEVICE — DRAPE TOWEL BLUE 17" X 24"

## (undated) DEVICE — UTERINE MANIPULATOR COOPER SURGICAL 5MM 33CM GREEN

## (undated) DEVICE — DRSG TELFA 3 X 8

## (undated) DEVICE — LIGASURE BLUNT TIP 37CM

## (undated) DEVICE — TROCAR COVIDIEN VERSAONE BLADED FIXATION 11MM STANDARD

## (undated) DEVICE — PACK GYN LAPAROSCOPY

## (undated) DEVICE — ENDOCATCH II 15MM

## (undated) DEVICE — Device

## (undated) DEVICE — LAP PAD 18 X 18"

## (undated) DEVICE — STAPLER SKIN VISI-STAT 35 WIDE

## (undated) DEVICE — PREP BETADINE KIT

## (undated) DEVICE — INSUFFLATION NDL COVIDIEN STEP 14G FOR STEP/VERSASTEP

## (undated) DEVICE — POSITIONER FOAM EGG CRATE ULNAR 2PCS (PINK)

## (undated) DEVICE — UTERINE MANIPULATOR CONMED VCARE LG 37MM

## (undated) DEVICE — TROCAR COVIDIEN STEP 5MM SHORT 70MM

## (undated) DEVICE — UTERINE MANIPULATOR CONMED VCARE MED 34MM

## (undated) DEVICE — DISSECTOR COVIDIEN ROTICULATOR 5MM W MONOPOLAR CAUTERY

## (undated) DEVICE — NDL HYPO SAFE 18G X 1.5" (PINK)

## (undated) DEVICE — GRASPER COVIDIEN ENDO GRASP ROTICULATOR 5MM W SPIN LOCK

## (undated) DEVICE — STAPLER COVIDIEN ENDO GIA STANDARD HANDLE

## (undated) DEVICE — TUBING TUR 2 PRONG

## (undated) DEVICE — VISITEC 4X4

## (undated) DEVICE — DRSG STERISTRIPS 0.5 X 4"

## (undated) DEVICE — VALVE YELLOW PORT SEAL PLUS 5MM

## (undated) DEVICE — BLADE SCALPEL SAFETYLOCK #15

## (undated) DEVICE — DRAPE MAYO STAND 30"

## (undated) DEVICE — TUBING STRYKEFLOW II SUCTION / IRRIGATOR

## (undated) DEVICE — NDL COUNTER FOAM AND MAGNET 40-70

## (undated) DEVICE — WARMING BLANKET UPPER ADULT

## (undated) DEVICE — TROCAR APPLIED MEDICAL KII BALLOON BLUNT TIP 12MM X 100MM

## (undated) DEVICE — PREP CHLORAPREP HI-LITE ORANGE 26ML

## (undated) DEVICE — SUT BIOSYN 4-0 18" P-12